# Patient Record
Sex: FEMALE | Race: WHITE | Employment: OTHER | URBAN - METROPOLITAN AREA
[De-identification: names, ages, dates, MRNs, and addresses within clinical notes are randomized per-mention and may not be internally consistent; named-entity substitution may affect disease eponyms.]

---

## 2017-09-22 ENCOUNTER — HOSPITAL ENCOUNTER (OUTPATIENT)
Dept: LAB | Age: 72
Discharge: HOME | End: 2017-09-22

## 2017-09-22 PROCEDURE — 88312 SPECIAL STAINS GROUP 1: CPT | Performed by: INTERNAL MEDICINE

## 2017-09-22 PROCEDURE — 88305 TISSUE EXAM BY PATHOLOGIST: CPT | Performed by: INTERNAL MEDICINE

## 2017-10-19 PROBLEM — R91.1 LUNG NODULE: Status: ACTIVE | Noted: 2017-10-19

## 2017-10-19 PROBLEM — Z01.810 PREOP CARDIOVASCULAR EXAM: Status: ACTIVE | Noted: 2017-10-19

## 2018-02-20 PROBLEM — C34.90 ADENOCARCINOMA OF LUNG (HCC): Status: ACTIVE | Noted: 2018-02-20

## 2018-02-20 PROBLEM — I71.40 ABDOMINAL AORTIC ANEURYSM (AAA) WITHOUT RUPTURE: Status: ACTIVE | Noted: 2018-02-20

## 2018-09-03 ENCOUNTER — ANESTHESIA EVENT (OUTPATIENT)
Dept: ENDOSCOPY | Age: 73
End: 2018-09-03
Payer: MEDICARE

## 2018-09-04 ENCOUNTER — HOSPITAL ENCOUNTER (OUTPATIENT)
Age: 73
Setting detail: OUTPATIENT SURGERY
Discharge: HOME | End: 2018-09-04
Attending: INTERNAL MEDICINE | Admitting: INTERNAL MEDICINE
Payer: MEDICARE

## 2018-09-04 ENCOUNTER — ANESTHESIA (OUTPATIENT)
Dept: ENDOSCOPY | Age: 73
End: 2018-09-04
Payer: MEDICARE

## 2018-09-04 VITALS
WEIGHT: 88 LBS | RESPIRATION RATE: 18 BRPM | TEMPERATURE: 98 F | HEART RATE: 54 BPM | BODY MASS INDEX: 18.99 KG/M2 | OXYGEN SATURATION: 92 % | HEIGHT: 57 IN | DIASTOLIC BLOOD PRESSURE: 67 MMHG | SYSTOLIC BLOOD PRESSURE: 151 MMHG

## 2018-09-04 PROCEDURE — 76060000031 HC ANESTHESIA FIRST 0.5 HR: Performed by: INTERNAL MEDICINE

## 2018-09-04 PROCEDURE — 74011250636 HC RX REV CODE- 250/636

## 2018-09-04 PROCEDURE — 76040000025: Performed by: INTERNAL MEDICINE

## 2018-09-04 PROCEDURE — 77030029929: Performed by: INTERNAL MEDICINE

## 2018-09-04 PROCEDURE — 74011250636 HC RX REV CODE- 250/636: Performed by: ANESTHESIOLOGY

## 2018-09-04 PROCEDURE — 74011250636 HC RX REV CODE- 250/636: Performed by: INTERNAL MEDICINE

## 2018-09-04 RX ORDER — SODIUM CHLORIDE 0.9 % (FLUSH) 0.9 %
5-10 SYRINGE (ML) INJECTION AS NEEDED
Status: DISCONTINUED | OUTPATIENT
Start: 2018-09-04 | End: 2018-09-04 | Stop reason: HOSPADM

## 2018-09-04 RX ORDER — SODIUM CHLORIDE, SODIUM LACTATE, POTASSIUM CHLORIDE, CALCIUM CHLORIDE 600; 310; 30; 20 MG/100ML; MG/100ML; MG/100ML; MG/100ML
100 INJECTION, SOLUTION INTRAVENOUS CONTINUOUS
Status: DISCONTINUED | OUTPATIENT
Start: 2018-09-04 | End: 2018-09-04 | Stop reason: HOSPADM

## 2018-09-04 RX ORDER — PROPOFOL 10 MG/ML
INJECTION, EMULSION INTRAVENOUS AS NEEDED
Status: DISCONTINUED | OUTPATIENT
Start: 2018-09-04 | End: 2018-09-04 | Stop reason: HOSPADM

## 2018-09-04 RX ORDER — LIDOCAINE HYDROCHLORIDE 20 MG/ML
INJECTION, SOLUTION EPIDURAL; INFILTRATION; INTRACAUDAL; PERINEURAL AS NEEDED
Status: DISCONTINUED | OUTPATIENT
Start: 2018-09-04 | End: 2018-09-04 | Stop reason: HOSPADM

## 2018-09-04 RX ORDER — PROPOFOL 10 MG/ML
INJECTION, EMULSION INTRAVENOUS
Status: DISCONTINUED | OUTPATIENT
Start: 2018-09-04 | End: 2018-09-04 | Stop reason: HOSPADM

## 2018-09-04 RX ADMIN — LIDOCAINE HYDROCHLORIDE 50 MG: 20 INJECTION, SOLUTION EPIDURAL; INFILTRATION; INTRACAUDAL; PERINEURAL at 13:19

## 2018-09-04 RX ADMIN — PROPOFOL 75 MCG/KG/MIN: 10 INJECTION, EMULSION INTRAVENOUS at 13:19

## 2018-09-04 RX ADMIN — PROPOFOL 50 MG: 10 INJECTION, EMULSION INTRAVENOUS at 13:19

## 2018-09-04 RX ADMIN — SODIUM CHLORIDE, SODIUM LACTATE, POTASSIUM CHLORIDE, AND CALCIUM CHLORIDE: 600; 310; 30; 20 INJECTION, SOLUTION INTRAVENOUS at 13:13

## 2018-09-04 RX ADMIN — ONABOTULINUMTOXINA 100 UNITS: 100 INJECTION, POWDER, LYOPHILIZED, FOR SOLUTION INTRADERMAL; INTRAMUSCULAR at 13:25

## 2018-09-04 RX ADMIN — SODIUM CHLORIDE, SODIUM LACTATE, POTASSIUM CHLORIDE, AND CALCIUM CHLORIDE 100 ML/HR: 600; 310; 30; 20 INJECTION, SOLUTION INTRAVENOUS at 11:48

## 2018-09-04 NOTE — IP AVS SNAPSHOT
Giana Parada 
 
 
 [address] [facility]  [address] [facility] 
[phone] Patient: Leyda Quispe MRN: [id number] OVN:[id number] About your hospitalization You were admitted on:  September 4, 2018 You last received care in the:  [facility] You were discharged on:  September 4, 2018 Why you were hospitalized Your primary diagnosis was:  Not on File Follow-up Information None Your Scheduled Appointments Friday September 14, 2018  9:00 AM EDT Office Visit with Giana Parada MD  
[facility] ([address] [place] [address]) [address] [place] [address] [address] [facility] 81  
[phone] Discharge Orders None A check felipe indicates which time of day the medication should be taken. My Medications ASK your doctor about these medications Instructions Each Dose to Equal  
 Morning Noon Evening Bedtime  
 albuterol 90 mcg/actuation inhaler Commonly known as:  PROVENTIL HFA, VENTOLIN HFA, PROAIR HFA Your last dose was: Your next dose is: Take 2 Puffs by inhalation every four (4) hours as needed. 2 Puff ALPRAZolam 1 mg tablet Commonly known as:  Rebbeca Elmer Your last dose was: Your next dose is: Take 1 mg by mouth nightly as needed. 1 mg  
    
   
   
   
  
 amoxicillin 500 mg Tab Your last dose was: Your next dose is: Take  by mouth. As directed for dental visits  
     
   
   
   
  
 apixaban 5 mg tablet Commonly known as:  Naif Koch Your last dose was: Your next dose is: Take 1 Tab by mouth two (2) times a day. 5 mg  
    
   
   
   
  
 ferrous sulfate 134 mg (27 mg iron) Tab Your last dose was: Your next dose is: Take 2 Tabs by mouth daily. 2 Tab FOSAMAX 70 mg tablet Generic drug:  alendronate Your last dose was: Your next dose is: Take 70 mg by mouth every seven (7) days. 70 mg  
    
   
   
   
  
 glucosamine-chondroitin 500-400 mg Cap Commonly known as:  [facility] Your last dose was: Your next dose is: Take 1 Cap by mouth daily. 1 Cap HYDROcodone-homatropine 5-1.5 mg/5 mL syrup Commonly known as:  HYCODAN Your last dose was: Your next dose is: Take 5 mL by mouth four (4) times daily as needed. 5 mL  
    
   
   
   
  
 lisinopril 10 mg tablet Commonly known as:  Gianni Economy Your last dose was: Your next dose is: Take 10 mg by mouth daily. 10 mg  
    
   
   
   
  
 LIVALO 2 mg tablet Generic drug:  pitavastatin calcium Your last dose was: Your next dose is: Take 2 mg by mouth daily. 2 mg Magnesium Oxide 500 mg Cap Your last dose was: Your next dose is: Take 500 mg by mouth every other day. 500 mg  
    
   
   
   
  
 multivitamin tablet Commonly known as:  ONE A DAY Your last dose was: Your next dose is: Take 1 Tab by mouth daily. 1 Tab  
    
   
   
   
  
 OCUVITE PO Your last dose was: Your next dose is: Take  by mouth. OLANZapine 5 mg tablet Commonly known as:  ZyPREXA Your last dose was: Your next dose is: Take 5 mg by mouth nightly. 5 mg  
    
   
   
   
  
 omega-3 fatty acids-vitamin e 1,000 mg Cap Your last dose was: Your next dose is: Take 1 Cap by mouth. 1 Cap  
    
   
   
   
  
 omeprazole 20 mg capsule Commonly known as:  PRILOSEC Your last dose was: Your next dose is: Take 20 mg by mouth two (2) times a day.   
 20 mg  
    
   
   
   
  
 OTHER Your last dose was: Your next dose is: Focus Factor- 1 po qd  
     
   
   
   
  
 raNITIdine 300 mg tablet Commonly known as:  ZANTAC Your last dose was: Your next dose is: Take 300 mg by mouth daily. 300 mg  
    
   
   
   
  
 tamsulosin 0.4 mg capsule Commonly known as:  FLOMAX Your last dose was: Your next dose is: Take 0.4 mg by mouth daily. 0.4 mg  
    
   
   
   
  
 VITAMIN B-12 1,000 mcg sublingual tablet Generic drug:  cyanocobalamin Your last dose was: Your next dose is: Take 1,000 mcg by mouth daily. 1000 mcg VITAMIN D3 2,000 unit Tab Generic drug:  cholecalciferol (vitamin D3) Your last dose was: Your next dose is: Take 2,000 Units by mouth daily. 2000 Units ZOFRAN PO Your last dose was: Your next dose is: Take  by mouth. Opioid Education Prescription Opioids: What You Need to Know: 
 
Prescription opioids can be used to help relieve moderate-to-severe pain and are often prescribed following a surgery or injury, or for certain health conditions. These medications can be an important part of treatment but also come with serious risks. Opioids are strong pain medicines. Examples include hydrocodone, oxycodone, fentanyl, and morphine. Heroin is an example of an illegal opioid. It is important to work with your health care provider to make sure you are getting the safest, most effective care. WHAT ARE THE RISKS AND SIDE EFFECTS OF OPIOID USE? Prescription opioids carry serious risks of addiction and overdose, especially with prolonged use. An opioid overdose, often marked by slow breathing, can cause sudden death. The use of prescription opioids can have a number of side effects as well, even when taken as directed. · Tolerance-meaning you might need to take more of a medication for the same pain relief · Physical dependence-meaning you have symptoms of withdrawal when the medication is stopped. Withdrawal symptoms can include nausea, sweating, chills, diarrhea, stomach cramps, and muscle aches. Withdrawal can last up to several weeks, depending on which drug you took and how long you took it. · Increased sensitivity to pain · Constipation · Nausea, vomiting, and dry mouth · Sleepiness and dizziness · Confusion · Depression · Low levels of testosterone that can result in lower sex drive, energy, and strength · Itching and sweating RISKS ARE GREATER WITH:      
· History of drug misuse, substance use disorder, or overdose · Mental health conditions (such as depression or anxiety) · Sleep apnea · Older age (72 years or older) · Pregnancy Avoid alcohol while taking prescription opioids. Also, unless specifically advised by your health care provider, medications to avoid include: · Benzodiazepines (such as Xanax or Valium) · Muscle relaxants (such as Soma or Flexeril) · Hypnotics (such as Ambien or Lunesta) · Other prescription opioids KNOW YOUR OPTIONS Talk to your health care provider about ways to manage your pain that don't involve prescription opioids. Some of these options may actually work better and have fewer risks and side effects. Options may include: 
· Pain relievers such as acetaminophen, ibuprofen, and naproxen · Some medications that are also used for depression or seizures · Physical therapy and exercise · Counseling to help patients learn how to cope better with triggers of pain and stress. · Application of heat or cold compress · Massage therapy · Relaxation techniques Be Informed Make sure you know the name of your medication, how much and how often to take it, and its potential risks & side effects.  
 
IF YOU ARE PRESCRIBED OPIOIDS FOR PAIN: 
 · Never take opioids in greater amounts or more often than prescribed. Remember the goal is not to be pain-free but to manage your pain at a tolerable level. · Follow up with your primary care provider to: · Work together to create a plan on how to manage your pain. · Talk about ways to help manage your pain that don't involve prescription opioids. · Talk about any and all concerns and side effects. · Help prevent misuse and abuse. · Never sell or share prescription opioids · Help prevent misuse and abuse. · Store prescription opioids in a secure place and out of reach of others (this may include visitors, children, friends, and family). · Safely dispose of unused/unwanted prescription opioids: Find your community drug take-back program or your pharmacy mail-back program, or flush them down the toilet, following guidance from the Food and Drug Administration (www.fda.gov/Drugs/ResourcesForYou). · Visit www.cdc.gov/drugoverdose to learn about the risks of opioid abuse and overdose. · If you believe you may be struggling with addiction, tell your health care provider and ask for guidance or call [facility] at 1-356-876-UIYE. Discharge Instructions Gastrointestinal Esophagogastroduodenoscopy (EGD) - Upper Exam Discharge Instructions 1. Call Dr. Binh Hayes for any problems or questions. 2. Contact the doctor's office for follow up appointment as directed. 3. Medication may cause drowsiness for several hours, therefore, do not drive or operate machinery for remainder of the day. 4. No alcohol today. 5. Ordinarily, you may resume regular diet and activity after exam unless otherwise specified by your physician. 6. For mild soreness in your throat you may use Cepacol throat lozenges or warm salt-water gargles as needed. Any additional instructions: Follow up with dr Binh Hayes in his office in 4-6 weeks Instructions given to Effie Escalante and other family members. ACO Transitions of Care Introducing [facility] offers a voluntary care coordination program to provide high quality service and care to [facility] fee-for-service beneficiaries. Dione Davis was designed to help you enhance your health and well-being through the following services: ? Transitions of Care  support for individuals who are transitioning from one care setting to another (example: Hospital to home). ? Chronic and Complex Care Coordination  support for individuals and caregivers of those with serious or chronic illnesses or with more than one chronic (ongoing) condition and those who take a number of different medications. If you meet specific medical criteria, a [address] [place] [address] Rd may call you directly to coordinate your care with your primary care physician and your other care providers. For questions about the [facility] programs, please, contact your physicians office. For general questions or additional information about Accountable Care Organizations: 
Please visit www.medicare.gov/acos. html or call 1-800-MEDICARE (0-106.680.2823) TTY users should call 2-138.329.1447. Introducing [facility]! Dear Katie Zavala: Thank you for requesting a [employer] account. Our records indicate that you already have an active [employer] account. You can access your account anytime at [email or web address] Did you know that you can access your hospital and ER discharge instructions at any time in Spark CRM? You can also review all of your test results from your hospital stay or ER visit. Additional Information If you have questions, please visit the Frequently Asked Questions section of the [employer] website at [email or web address] Remember, MyChart is NOT to be used for urgent needs. For medical emergencies, dial 911. Now available from your iPhone and Android! Introducing Mark Kapadia As a [employer] patient, I wanted to make you aware of our electronic visit tool called Mark Kapadia. [employer] 24/7 allows you to connect within minutes with a medical provider 24 hours a day, seven days a week via a mobile device or tablet or logging into a secure website from your computer. You can access Mark Kapadia from anywhere in the [place]. A virtual visit might be right for you when you have a simple condition and feel like you just dont want to get out of bed, or cant get away from work for an appointment, when your regular [employer] provider is not available (evenings, weekends or holidays), or when youre out of town and need minor care. Electronic visits cost only $49 and if the [employer] 24/7 provider determines a prescription is needed to treat your condition, one can be electronically transmitted to a nearby pharmacy*. Please take a moment to enroll today if you have not already done so. The enrollment process is free and takes just a few minutes. To enroll, please download the [employer] 24/7 shelby to your tablet or phone, or visit [email or web address] to enroll on your computer. And, as an [address] [place] [address] patient with a [employer] account, the results of your visits will be scanned into your electronic medical record and your primary care provider will be able to view the scanned results. We urge you to continue to see your regular [employer] provider for your ongoing medical care. And while your primary care provider may not be the one available when you seek a Mark Kapadia virtual visit, the peace of mind you get from getting a real diagnosis real time can be priceless.    
 
For more information on Mark Davidrafaelafin, view our Frequently Asked Questions (FAQs) at [email or web address]. Sincerely, 
 
Alec Winston MD 
Chief Medical Officer [address] *:  certain medications cannot be prescribed via Mark Kapadia Providers Seen During Your Hospitalization Provider Specialty Primary office phone Manjit Arreaga MD Gastroenterology [phone] Your Primary Care Physician (PCP) Primary Care Physician Office Phone Office Fax Rhys Cordova [phone] You are allergic to the following Allergen Reactions Aspirin Unknown (comments) Colestipol Other (comments) Abdominal pain Flagyl (Metronidazole) Unknown (comments) Linzess (Linaclotide) Diarrhea Sulfa (Sulfonamide Antibiotics) Unknown (comments) Tequin (Gatifloxacin) Unknown (comments) Tramadol Unknown (comments) Recent Documentation Height Weight BMI OB Status Smoking Status 1.448 m 39.9 kg 19.04 kg/m2 Hysterectomy Former Smoker Emergency Contacts Name Discharge Info Relation Home Work Mobile Rasta Berumen  Spouse [3] [phone] Marielos Bucio  Sister [23] [phone] Patient Belongings The following personal items are in your possession at time of discharge: 
  Dental Appliances: None  Visual Aid: Glasses Please provide this summary of care documentation to your next provider. Signatures-by signing, you are acknowledging that this After Visit Summary has been reviewed with you and you have received a copy. Patient Signature:  ____________________________________________________________ Date:  ____________________________________________________________  
  
[employer] Provider Signature:  ____________________________________________________________ Date:  ____________________________________________________________

## 2018-09-04 NOTE — DISCHARGE INSTRUCTIONS
Gastrointestinal Esophagogastroduodenoscopy (EGD) - Upper Exam Discharge Instructions    1. Call Dr. Babs Suarez for any problems or questions. 2. Contact the doctor's office for follow up appointment as directed. 3. Medication may cause drowsiness for several hours, therefore, do not drive or operate machinery for remainder of the day. 4. No alcohol today. 5. Ordinarily, you may resume regular diet and activity after exam unless otherwise specified by your physician. 6. For mild soreness in your throat you may use Cepacol throat lozenges or warm salt-water gargles as needed. Any additional instructions: Follow up with dr Babs Suarez in his office in 4-6 weeks     Instructions given to Leandra Purcell and other family members.

## 2018-09-04 NOTE — PROGRESS NOTES
Pt stopped eliquis on Sunday and started 81mg ASA in replacement. Does not take ASA as a regular medication.

## 2018-09-04 NOTE — H&P
Gastroenterology Outpatient History and Physical 
 
Patient: Shashi Degroot Physician: Steven Slater MD 
 
Vital Signs: Blood pressure 137/61, pulse (!) 49, temperature 98 °F (36.7 °C), resp. rate 16, height 4' 9\" (1.448 m), weight 39.9 kg (88 lb), SpO2 95 %. Allergies: Allergies Allergen Reactions  Aspirin Unknown (comments)  Colestipol Other (comments) Abdominal pain  Flagyl [Metronidazole] Unknown (comments)  Linzess [Linaclotide] Diarrhea  Sulfa (Sulfonamide Antibiotics) Unknown (comments)  Tequin [Gatifloxacin] Unknown (comments)  Tramadol Unknown (comments) Chief Complaint: dysphagia History of Present Illness: As Above Justification for Procedure: As Above History: 
Past Medical History:  
Diagnosis Date  Abnormal loss of weight 10/21/2015  Atrial fibrillation ([facility]  CAD (coronary atherosclerotic disease)  Cancer ([facility] lung  Chest pain, unspec 10/21/2015  Cough  Depression 10/21/2015  Dizziness/Vertigo 10/21/2015  Fever  GERD (gastroesophageal reflux disease)  Hypertension, benign  S/P aortic valve replacement with bioprosthetic valve  Seizure disorder [facility])   
 patient denies  Thoracic aortic aneurysm ([facility] Past Surgical History:  
Procedure Laterality Date  ABDOMEN SURGERY PROC UNLISTED  2000  
 stomach wrap  CARDIAC SURG PROCEDURE UNLIST  2008 Assending Aorta replaced  CARDIAC SURG PROCEDURE UNLIST  2009 Aortic arch replaced  CARDIAC SURG PROCEDURE UNLIST  2010  
 decending aorta replaced  HX CATARACT REMOVAL Bilateral   
 IOL  HX CHOLECYSTECTOMY  HX HERNIA REPAIR  2010  HX ORTHOPAEDIC  2005  
 3 vertabre fused  HX ORTHOPAEDIC Right 2006  
 right shoulder rotater cuff  HX OTHER SURGICAL Right 01/2018 Nodule removed from lung  HX TONSILLECTOMY  HX TRANSPLANT    
 aortic arch replaced with artificial, ascending and descending artificial parts  SC EGD DELIVER THERMAL ENERGY SPHNCTR/CARDIA GERD  2017  VASCULAR SURGERY PROCEDURE UNLIST Social History Social History  Marital status:  Spouse name: N/A  
 Number of children: N/A  
 Years of education: N/A Social History Main Topics  Smoking status: Former Smoker Quit date: 4/13/1993  Smokeless tobacco: Never Used  Alcohol use No  
 Drug use: No  
 Sexual activity: Not Asked Other Topics Concern  None Social History Narrative Family History Problem Relation Age of Onset  Alcohol abuse Father Medications:  
Prior to Admission medications Medication Sig Start Date End Date Taking? Authorizing Provider OLANZapine (ZYPREXA) 5 mg tablet Take 5 mg by mouth nightly. Yes Historical Provider  
lisinopril (PRINIVIL, ZESTRIL) 10 mg tablet Take 10 mg by mouth daily. Yes Historical Provider  
tamsulosin (FLOMAX) 0.4 mg capsule Take 0.4 mg by mouth daily. Yes Historical Provider  
alendronate (FOSAMAX) 70 mg tablet Take 70 mg by mouth every seven (7) days. Yes Historical Provider  
ferrous sulfate 134 mg (27 mg iron) tab Take 2 Tabs by mouth daily. Yes Historical Provider  
cholecalciferol, vitamin D3, (VITAMIN D3) 2,000 unit tab Take 2,000 Units by mouth daily. Yes Historical Provider HYDROcodone-homatropine (HYCODAN) 5-1.5 mg/5 mL syrup Take 5 mL by mouth four (4) times daily as needed. Yes Historical Provider  
pitavastatin calcium (LIVALO) 2 mg tablet Take 2 mg by mouth daily. Yes Historical Provider  
glucosamine-chondroitin (ARTHX) 500-400 mg cap Take 1 Cap by mouth daily. Yes Historical Provider  
cyanocobalamin (VITAMIN B-12) 1,000 mcg sublingual tablet Take 1,000 mcg by mouth daily. Yes Historical Provider  
raNITIdine (ZANTAC) 300 mg tablet Take 300 mg by mouth daily. Yes Historical Provider  
omeprazole (PRILOSEC) 20 mg capsule Take 20 mg by mouth two (2) times a day. Yes Historical Provider ALPRAZolam (XANAX) 1 mg tablet Take 1 mg by mouth nightly as needed. Yes Historical Provider  
multivitamin (ONE A DAY) tablet Take 1 Tab by mouth daily. Yes Historical Provider VIT C/VIT E/LUTEIN/MIN/OMEGA-3 (OCUVITE PO) Take  by mouth. Yes Historical Provider Magnesium Oxide 500 mg cap Take 500 mg by mouth every other day. Yes Historical Provider  
ondansetron HCl (ZOFRAN PO) Take  by mouth. Historical Provider  
amoxicillin 500 mg tab Take  by mouth. As directed for dental visits    Historical Provider  
apixaban (ELIQUIS) 5 mg tablet Take 1 Tab by mouth two (2) times a day. Patient taking differently: Take 5 mg by mouth daily. 4/19/18   Ritchie Michel MD  
OTHER Focus Factor- 1 po qd    Historical Provider  
albuterol (PROVENTIL HFA, VENTOLIN HFA, PROAIR HFA) 90 mcg/actuation inhaler Take 2 Puffs by inhalation every four (4) hours as needed. Historical Provider  
omega-3 fatty acids-vitamin e 1,000 mg cap Take 1 Cap by mouth. Historical Provider Physical Exam:  
  
  
Heart: regular rate and rhythm, S1, S2 normal, no murmur, click, rub or gallop Lungs: chest clear, no wheezing, rales, normal symmetric air entry, Heart exam - S1, S2 normal, no murmur, no gallop, rate regular Abdominal: Bowel sounds are normal, Bowel sounds active, liver is not enlarged, spleen is not enlarged Findings/Diagnosis: dysphagia Possible primary vs. Secondary achalasia from Nissen wrap. EGD with botox Signed: 
Lorraine Merritt MD 9/4/2018

## 2018-09-04 NOTE — ANESTHESIA PREPROCEDURE EVALUATION
Anesthetic History No history of anesthetic complications Review of Systems / Medical History Patient summary reviewed and pertinent labs reviewed Pulmonary Within defined limits Neuro/Psych Cardiovascular Hypertension Dysrhythmias (h/o afib) CAD Comments: S/p ascending, arch and descending aortic replacement. Denies angina or chest pain. 3METS. GI/Hepatic/Renal 
  
GERD Endo/Other Other Findings Physical Exam 
 
Airway Mallampati: II 
TM Distance: 4 - 6 cm Neck ROM: normal range of motion Cardiovascular Rhythm: regular Dental 
 
Dentition: Upper partial plate Pulmonary Breath sounds clear to auscultation Abdominal 
 
 
 
 Other Findings Anesthetic Plan ASA: 3 Anesthesia type: total IV anesthesia Induction: Intravenous Anesthetic plan and risks discussed with: Patient

## 2018-09-04 NOTE — PROCEDURES
Esophagogastroduodenoscopy    DATE of PROCEDURE: 9/4/2018    INDICATION:  1. History of Nissen fundoplication  2. Dysphagia (Achalasia on manometry)    POSTPROCEDURE DIAGNOSIS:  1. Nissen fundoplication  2. Successful botox injection    MEDICATIONS ADMINISTERED: MAC. Further details per anesthesia note. INSTRUMENT: [id number]    PROCEDURE:  After obtaining informed consent, the patient was placed in the left lateral position and sedated. The endoscope was advanced under direct vision without difficulty. The esophagus, stomach (including retroflexed views) and duodenum were evaluated. The patient was taken to the recovery area in stable condition. FINDINGS:  ESOPHAGUS:Resistance at EG junction with passage of gastroscope. 100 units botox injected into 4 quadrants at EG junction. Otherwise normal exam.  STOMACH: Intact Nissen fundoplication. Otherwise normal exam.  DUODENUM: Normal    Estimated blood loss: 0-minimal   Specimens obtained during procedure: none    PLAN:  1.  Follow-up in office in 4-6 weeks      Sherri Guido MD  [facility] [place]

## 2018-09-05 NOTE — ANESTHESIA POSTPROCEDURE EVALUATION
Post-Anesthesia Evaluation and Assessment Patient: Syd Nicole MRN: [id number]  SSN: [id number] YOB: 1945  Age: 74 y.o. Sex: female Cardiovascular Function/Vital Signs Visit Vitals  /67  Pulse (!) 54  Temp 36.7 °C (98 °F)  Resp 18  Ht 4' 9\" (1.448 m)  Wt 39.9 kg (88 lb)  SpO2 92%  BMI 19.04 kg/m2 Patient is status post total IV anesthesia anesthesia for Procedure(s): ESOPHAGOGASTRODUODENOSCOPY (EGD)  BMI 19 INJECTION. Nausea/Vomiting: None Postoperative hydration reviewed and adequate. Pain: 
Pain Scale 1: Numeric (0 - 10) (09/04/18 1407) Pain Intensity 1: 0 (09/04/18 1407) Managed Neurological Status: At baseline Mental Status and Level of Consciousness: Arousable Pulmonary Status:  
O2 Device: Room air (09/04/18 1407) Adequate oxygenation and airway patent Complications related to anesthesia: None Post-anesthesia assessment completed. No concerns Signed By: Amarilis Cortes MD   
 September 5, 2018

## 2018-11-13 PROBLEM — K22.0 ACHALASIA: Status: ACTIVE | Noted: 2018-11-13

## 2019-01-21 RX ORDER — METRONIDAZOLE 500 MG/100ML
500 INJECTION, SOLUTION INTRAVENOUS ONCE
Status: CANCELLED | OUTPATIENT
Start: 2019-01-21 | End: 2019-01-21

## 2019-01-24 ENCOUNTER — HOME HEALTH ADMISSION (OUTPATIENT)
Dept: HOME HEALTH SERVICES | Facility: HOME HEALTH | Age: 74
End: 2019-01-24

## 2019-01-27 ENCOUNTER — ANESTHESIA EVENT (OUTPATIENT)
Dept: ENDOSCOPY | Age: 74
End: 2019-01-27
Payer: MEDICARE

## 2019-01-27 RX ORDER — SODIUM CHLORIDE, SODIUM LACTATE, POTASSIUM CHLORIDE, CALCIUM CHLORIDE 600; 310; 30; 20 MG/100ML; MG/100ML; MG/100ML; MG/100ML
100 INJECTION, SOLUTION INTRAVENOUS CONTINUOUS
Status: CANCELLED | OUTPATIENT
Start: 2019-01-27

## 2019-01-28 ENCOUNTER — ANESTHESIA (OUTPATIENT)
Dept: ENDOSCOPY | Age: 74
End: 2019-01-28
Payer: MEDICARE

## 2019-01-28 ENCOUNTER — HOSPITAL ENCOUNTER (OUTPATIENT)
Age: 74
Setting detail: OUTPATIENT SURGERY
Discharge: HOME | End: 2019-01-28
Attending: INTERNAL MEDICINE | Admitting: INTERNAL MEDICINE
Payer: MEDICARE

## 2019-01-28 VITALS
BODY MASS INDEX: 17.47 KG/M2 | TEMPERATURE: 97 F | SYSTOLIC BLOOD PRESSURE: 172 MMHG | RESPIRATION RATE: 14 BRPM | HEART RATE: 64 BPM | WEIGHT: 81 LBS | OXYGEN SATURATION: 94 % | DIASTOLIC BLOOD PRESSURE: 81 MMHG | HEIGHT: 57 IN

## 2019-01-28 PROCEDURE — 74011250636 HC RX REV CODE- 250/636: Performed by: ANESTHESIOLOGY

## 2019-01-28 PROCEDURE — 74011250636 HC RX REV CODE- 250/636: Performed by: INTERNAL MEDICINE

## 2019-01-28 PROCEDURE — 76040000025: Performed by: INTERNAL MEDICINE

## 2019-01-28 PROCEDURE — 74011250637 HC RX REV CODE- 250/637: Performed by: INTERNAL MEDICINE

## 2019-01-28 PROCEDURE — 77030005122 HC CATH GASTMY PEG BSC -B: Performed by: INTERNAL MEDICINE

## 2019-01-28 PROCEDURE — 77030036554: Performed by: INTERNAL MEDICINE

## 2019-01-28 PROCEDURE — 74011250636 HC RX REV CODE- 250/636

## 2019-01-28 PROCEDURE — 74011000258 HC RX REV CODE- 258: Performed by: INTERNAL MEDICINE

## 2019-01-28 PROCEDURE — 76060000032 HC ANESTHESIA 0.5 TO 1 HR: Performed by: INTERNAL MEDICINE

## 2019-01-28 RX ORDER — METRONIDAZOLE 500 MG/100ML
500 INJECTION, SOLUTION INTRAVENOUS
Status: DISCONTINUED | OUTPATIENT
Start: 2019-01-28 | End: 2019-01-28

## 2019-01-28 RX ORDER — CLINDAMYCIN PHOSPHATE 900 MG/50ML
900 INJECTION INTRAVENOUS EVERY 8 HOURS
Status: DISCONTINUED | OUTPATIENT
Start: 2019-01-28 | End: 2019-01-28 | Stop reason: HOSPADM

## 2019-01-28 RX ORDER — CLINDAMYCIN PHOSPHATE 300 MG/50ML
300 INJECTION INTRAVENOUS ONCE
Status: DISCONTINUED | OUTPATIENT
Start: 2019-01-28 | End: 2019-01-28 | Stop reason: DRUGHIGH

## 2019-01-28 RX ORDER — ACETAMINOPHEN 325 MG/1
650 TABLET ORAL
Status: COMPLETED | OUTPATIENT
Start: 2019-01-28 | End: 2019-01-28

## 2019-01-28 RX ORDER — PROPOFOL 10 MG/ML
INJECTION, EMULSION INTRAVENOUS AS NEEDED
Status: DISCONTINUED | OUTPATIENT
Start: 2019-01-28 | End: 2019-01-28 | Stop reason: HOSPADM

## 2019-01-28 RX ORDER — CEFAZOLIN SODIUM/WATER 2 G/20 ML
2 SYRINGE (ML) INTRAVENOUS
Status: DISCONTINUED | OUTPATIENT
Start: 2019-01-28 | End: 2019-01-28

## 2019-01-28 RX ORDER — LIDOCAINE HYDROCHLORIDE 20 MG/ML
INJECTION, SOLUTION EPIDURAL; INFILTRATION; INTRACAUDAL; PERINEURAL AS NEEDED
Status: DISCONTINUED | OUTPATIENT
Start: 2019-01-28 | End: 2019-01-28 | Stop reason: HOSPADM

## 2019-01-28 RX ORDER — METRONIDAZOLE 500 MG/100ML
500 INJECTION, SOLUTION INTRAVENOUS ONCE
Status: DISCONTINUED | OUTPATIENT
Start: 2019-01-28 | End: 2019-01-28

## 2019-01-28 RX ORDER — SODIUM CHLORIDE, SODIUM LACTATE, POTASSIUM CHLORIDE, CALCIUM CHLORIDE 600; 310; 30; 20 MG/100ML; MG/100ML; MG/100ML; MG/100ML
100 INJECTION, SOLUTION INTRAVENOUS CONTINUOUS
Status: DISCONTINUED | OUTPATIENT
Start: 2019-01-28 | End: 2019-01-28 | Stop reason: HOSPADM

## 2019-01-28 RX ORDER — PROPOFOL 10 MG/ML
INJECTION, EMULSION INTRAVENOUS
Status: DISCONTINUED | OUTPATIENT
Start: 2019-01-28 | End: 2019-01-28 | Stop reason: HOSPADM

## 2019-01-28 RX ADMIN — ACETAMINOPHEN 650 MG: 325 TABLET, FILM COATED ORAL at 13:48

## 2019-01-28 RX ADMIN — PROPOFOL 140 MCG/KG/MIN: 10 INJECTION, EMULSION INTRAVENOUS at 12:46

## 2019-01-28 RX ADMIN — LIDOCAINE HYDROCHLORIDE 30 MG: 20 INJECTION, SOLUTION EPIDURAL; INFILTRATION; INTRACAUDAL; PERINEURAL at 12:43

## 2019-01-28 RX ADMIN — SODIUM CHLORIDE 1000 MG: 900 INJECTION, SOLUTION INTRAVENOUS at 12:34

## 2019-01-28 RX ADMIN — PROPOFOL 20 MG: 10 INJECTION, EMULSION INTRAVENOUS at 12:42

## 2019-01-28 RX ADMIN — PROPOFOL 30 MG: 10 INJECTION, EMULSION INTRAVENOUS at 12:44

## 2019-01-28 RX ADMIN — SODIUM CHLORIDE, SODIUM LACTATE, POTASSIUM CHLORIDE, AND CALCIUM CHLORIDE: 600; 310; 30; 20 INJECTION, SOLUTION INTRAVENOUS at 12:37

## 2019-01-28 RX ADMIN — SODIUM CHLORIDE, SODIUM LACTATE, POTASSIUM CHLORIDE, AND CALCIUM CHLORIDE 100 ML/HR: 600; 310; 30; 20 INJECTION, SOLUTION INTRAVENOUS at 12:11

## 2019-01-28 RX ADMIN — CLINDAMYCIN PHOSPHATE 900 MG: 900 INJECTION, SOLUTION INTRAVENOUS at 12:40

## 2019-01-28 RX ADMIN — PROPOFOL 30 MG: 10 INJECTION, EMULSION INTRAVENOUS at 12:43

## 2019-01-28 NOTE — PROCEDURES
PERCUTANEOUS ENDOSCOPIC GASTROSTOMY (PEG)    DATE of PROCEDURE: 1/28/2019    INDICATION:  1. Dysphagia  2. Malnutrition    POSTPROCEDURE DIAGNOSIS:  1. PEG placement    MEDICATIONS ADMINISTERED: MAC. Further details per anesthesia note. INSTRUMENT: [id number]    PROCEDURE:  After obtaining informed consent, the patient was left in the supine position and sedated. The endoscope was advanced under direct vision without difficulty. The esophagus, stomach (including retroflexed views) and duodenum were evaluated. PEG placement is as per surgeon's note. Re-endoscopy after the PEG confirmed appropriate position/location of inner bolster in the anterior wall, body of the stomach. The patient was taken to the recovery area in stable condition. FINDINGS:  ESOPHAGUS: Normal  STOMACH: 20 Fr PEG placed with external bolster at 2.5 cm. Otherwise normal exam.  DUODENUM: Normal.    Estimated blood loss: 0-minimal   Specimens obtained during procedure: none    PLAN:  - May use PEG tube immediately for medications  - May use PEG tube in 4 hours for tube feeds per nutrition recommendations  - Keep head of bed elevated 30 degrees while PEG tube in use  - Check residuals qshift. Hold tube feeds if residual >60ml. Recheck residual in 2 hours and call house officer if still >60ml.    - Rotate external bolster daily.   - Clean PEG tube site with soap and water daily  - Flush PEG tube with 60 mL water Q8H and after each use    Sania Pineda MD  [facility], [place]

## 2019-01-28 NOTE — H&P
Gastroenterology Outpatient History and Physical 
 
Patient: Berto Cuff Physician: Marisol Ontiveros MD 
 
Vital Signs: Blood pressure 183/79, pulse 74, temperature 98.6 °F (37 °C), resp. rate 18, height 4' 9\" (1.448 m), weight 36.7 kg (81 lb), SpO2 99 %. Allergies: Allergies Allergen Reactions  Sulfa (Sulfonamide Antibiotics) Anaphylaxis  Aspirin Unknown (comments) Upset stomach-- \" eats my stomach\"  Colestipol Other (comments) Abdominal pain  Flagyl [Metronidazole] Nausea and Vomiting  Linzess [Linaclotide] Diarrhea  Tequin [Gatifloxacin] Nausea and Vomiting  Tramadol Nausea and Vomiting Chief Complaint: dysphagia, malnutrition History of Present Illness: As Above Justification for Procedure: As Above History: 
Past Medical History:  
Diagnosis Date  AAA (abdominal aortic aneurysm) ([facility] [address] 6.6 cm-- per spouse-- pt needing to gain wt so she can have surg-- reason PEG being placed  Abnormal loss of weight 10/21/2015  Atrial fibrillation ([facility] [address] followed by dr Kyle Dennison  CAD (coronary atherosclerotic disease)   
 no mi/ no stents-- valve replaced 2008--followed by dr Kyle Dennison  Cancer [facility]) onset 1/2018  
 right lung --- surg only--- followed by dr Geraldine Pham  Chest pain, unspec 10/21/2015  
 occ--- followed by dr. Kyle Dennison  Cough  Depression 10/21/2015  Dizziness/Vertigo 10/21/2015  
 occ  GERD (gastroesophageal reflux disease)   
  not very well controlled  Hypertension, benign   
 controlled with med  Marijuana use   
 smokes most days per pt-- for pain  S/P aortic valve replacement with bioprosthetic valve  Thoracic aortic aneurysm ([facility] [address] replaced---2008, 2009, 2010 Past Surgical History:  
Procedure Laterality Date  ABDOMEN SURGERY PROC UNLISTED  2000  
 stomach wrap  CARDIAC SURG PROCEDURE UNLIST  2008 Assending Aorta replaced  CARDIAC SURG PROCEDURE UNLIST  2009 Aortic arch replaced  CARDIAC SURG PROCEDURE UNLIST  2010  
 decending aorta replaced  HX CATARACT REMOVAL Bilateral   
 IOL  HX CHOLECYSTECTOMY  HX HERNIA REPAIR  2010  HX ORTHOPAEDIC  2005  
 3 vertabre fused--- cervical  
 HX ORTHOPAEDIC Right 2006  
 right shoulder rotater cuff  HX OTHER SURGICAL Right 2018 Nodule removed from lung  HX OTHER SURGICAL  2018  
 egd  HX TONSILLECTOMY  HX TRANSPLANT    
 aortic arch replaced with artificial, ascending and descending artificial parts  SD EGD DELIVER THERMAL ENERGY SPHNCTR/CARDIA GERD  2017  VASCULAR SURGERY PROCEDURE UNLIST Social History Socioeconomic History  Marital status:  Spouse name: Not on file  Number of children: Not on file  Years of education: Not on file  Highest education level: Not on file Tobacco Use  Smoking status: Former Smoker Packs/day: 1.00 Years: 20.00 Pack years: 20.00 Last attempt to quit: 1993 Years since quittin.8  Smokeless tobacco: Never Used Substance and Sexual Activity  Alcohol use: No  
 Drug use: Yes Types: Marijuana Comment: pt states uses for pain-- smokes daily Family History Problem Relation Age of Onset  Alcohol abuse Father  Cancer Sister Medications:  
Prior to Admission medications Medication Sig Start Date End Date Taking? Authorizing Provider  
pitavastatin calcium (LIVALO) 1 mg tab tablet Take 1 mg by mouth daily. Provider, Historical  
metoprolol tartrate (LOPRESSOR) 25 mg tablet Take 25 mg by mouth daily. Provider, Historical  
omeprazole magnesium (PRILOSEC PO) Take 20 mg by mouth daily. Provider, Historical  
acetaminophen (TYLENOL) 325 mg tablet Take 500 mg by mouth nightly. Provider, Historical  
dicyclomine (BENTYL) 10 mg capsule Take 10 mg by mouth three (3) times daily as needed.     Provider, Historical  
 QUEtiapine (SEROQUEL) 100 mg tablet Take 150 mg by mouth every evening. Provider, Historical  
ondansetron HCl (ZOFRAN PO) Take  by mouth daily as needed. Provider, Historical  
tamsulosin (FLOMAX) 0.4 mg capsule Take 0.4 mg by mouth daily. Provider, Historical  
alendronate (FOSAMAX) 70 mg tablet Take 70 mg by mouth every seven (7) days. Takes on sat    Provider, Historical  
amoxicillin 500 mg tab Take  by mouth. As directed for dental visits    Provider, Historical  
cholecalciferol, vitamin D3, (VITAMIN D3) 2,000 unit tab Take 2,000 Units by mouth every Monday, Wednesday, Friday. Provider, Historical  
HYDROcodone-homatropine (HYCODAN) 5-1.5 mg/5 mL syrup Take 5 mL by mouth four (4) times daily as needed. Provider, Historical  
glucosamine-chondroitin (ARTHX) 500-400 mg cap Take 1 Cap by mouth daily. Provider, Historical  
cyanocobalamin (VITAMIN B-12) 1,000 mcg sublingual tablet Take 1,000 mcg by mouth daily. Provider, Historical  
raNITIdine (ZANTAC) 300 mg tablet Take 300 mg by mouth daily. Provider, Historical  
albuterol (PROVENTIL HFA, VENTOLIN HFA, PROAIR HFA) 90 mcg/actuation inhaler Take 2 Puffs by inhalation every four (4) hours as needed. Provider, Historical  
ALPRAZolam (XANAX) 1 mg tablet Take 1 mg by mouth nightly. Provider, Historical  
omega-3 fatty acids-vitamin e 1,000 mg cap Take 1 Cap by mouth. Provider, Historical  
multivitamin (ONE A DAY) tablet Take 1 Tab by mouth daily. Provider, Historical  
VIT C/VIT E/LUTEIN/MIN/OMEGA-3 (OCUVITE PO) Take  by mouth. Provider, Historical  
Magnesium Oxide 500 mg cap Take 500 mg by mouth daily as needed. Provider, Historical  
 
 
Physical Exam:  
  
  
Heart: regular rate and rhythm, S1, S2 normal, no murmur, click, rub or gallop Lungs: chest clear, no wheezing, rales, normal symmetric air entry, Heart exam - S1, S2 normal, no murmur, no gallop, rate regular Abdominal: Bowel sounds are normal, Bowel sounds active, liver is not enlarged, spleen is not enlarged Findings/Diagnosis: Dysphagia, malnutrition PEG Signed: 
Cathy Ayers MD 1/28/2019

## 2019-01-28 NOTE — ANESTHESIA POSTPROCEDURE EVALUATION
Procedure(s): PERCUTANEOUS ENDOSCOPIC GASTROSTOMY TUBE INSERTION 
ESOPHAGOGASTRODUODENOSCOPY (EGD). Anesthesia Post Evaluation Patient location during evaluation: PACU Patient participation: complete - patient participated Level of consciousness: awake Pain management: satisfactory to patient Airway patency: patent Anesthetic complications: no 
Cardiovascular status: hemodynamically stable Respiratory status: spontaneous ventilation Hydration status: euvolemic Post anesthesia nausea and vomiting:  none Visit Vitals /70 Pulse 68 Temp 36.1 °C (97 °F) Resp 16 Ht 4' 9\" (1.448 m) Wt 36.7 kg (81 lb) SpO2 98% BMI 17.53 kg/m²

## 2019-01-28 NOTE — PROGRESS NOTES
Pt c/o 8/10 abdominal pain at PEG insertion site. Dr. Adam Coburn informed, orders received to give 650mg Tylenol PO once.

## 2019-01-28 NOTE — ROUTINE PROCESS
VSS. Discharge instructions reviewed with patient and  and copy of instructions sent home with patient. Dr. Ila Pickett spoke with patient and  prior to discharge. Questions answered. Discharged via car, wheeled out by [facility]. IV discontinued prior to discharge. Personal items with patient at discharge: clothing

## 2019-01-28 NOTE — ANESTHESIA PREPROCEDURE EVALUATION
Anesthetic History No history of anesthetic complications Review of Systems / Medical History Patient summary reviewed and pertinent labs reviewed Pulmonary Smoker (marijuana) Neuro/Psych Psychiatric history Cardiovascular Hypertension Valvular problems/murmurs (AVR 2008) Dysrhythmias (h/o afib) : atrial fibrillation CAD and PAD (6.6 cm AAA, s/p thoracic EVAR ) Exercise tolerance: <4 METS: Ambulates well unassisted Comments: S/p ascending, arch and descending aortic replacement. Denies angina or chest pain. 3METS. Preserved EF.  
GI/Hepatic/Renal 
  
GERD Comments: Achalasia Endo/Other Cancer (right lung) Other Findings Physical Exam 
 
Airway Mallampati: II 
TM Distance: 4 - 6 cm Neck ROM: normal range of motion Cardiovascular Rhythm: regular Rate: normal 
 
 
 
 Dental 
 
Dentition: Upper partial plate Pulmonary Breath sounds clear to auscultation Abdominal 
 
 
 
 Other Findings Anesthetic Plan ASA: 4 Anesthesia type: total IV anesthesia Induction: Intravenous Anesthetic plan and risks discussed with: Patient and Spouse Needs PEG to gain strength for upcoming AAA surgery. Pt denies sxs from AAA. I explained my concerns regarding her AAA.

## 2019-01-28 NOTE — DISCHARGE INSTRUCTIONS
Gastrointestinal Esophagogastroduodenoscopy (EGD)/ Endoscopic Ultrasound(EUS)- Upper Exam Discharge Instructions    1. Call Dr. Adam Coburn  for any problems or questions. 2. Contact the doctor's office for follow up appointment as directed. 3. Medication may cause drowsiness for several hours, therefore, do not drive or operate machinery for remainder of the day. 4. No alcohol today. 5. Ordinarily, you may resume regular diet and activity after exam unless otherwise specified by your physician. 6. For mild soreness in your throat you may use Cepacol throat lozenges or warm  salt-water gargles as needed. Any additional instructions:   - May use PEG tube immediately for medications  - May use PEG tube in 4 hours for tube feeds per nutrition recommendations  - Keep head of bed elevated 30 degrees while PEG tube in use  - Check residuals qshift. Hold tube feeds if residual >60ml. Recheck residual in 2 hours and call house officer if still >60ml.    - Rotate external bolster daily. - Clean PEG tube site with soap and water daily  - Flush PEG tube with 60 mL water Q8H and after each use         Instructions given to Stefan Jordan and other family members.

## 2019-09-24 PROBLEM — Z01.810 PREOP CARDIOVASCULAR EXAM: Status: RESOLVED | Noted: 2017-10-19 | Resolved: 2019-09-24

## 2019-11-03 PROBLEM — Z01.810 PREOP CARDIOVASCULAR EXAM: Status: RESOLVED | Noted: 2017-10-19 | Resolved: 2019-11-03

## 2019-12-30 ENCOUNTER — ANESTHESIA EVENT (OUTPATIENT)
Dept: ENDOSCOPY | Age: 74
End: 2019-12-30
Payer: MEDICARE

## 2019-12-31 ENCOUNTER — HOSPITAL ENCOUNTER (OUTPATIENT)
Age: 74
Setting detail: OUTPATIENT SURGERY
Discharge: HOME | End: 2019-12-31
Attending: INTERNAL MEDICINE | Admitting: INTERNAL MEDICINE
Payer: MEDICARE

## 2019-12-31 ENCOUNTER — ANESTHESIA (OUTPATIENT)
Dept: ENDOSCOPY | Age: 74
End: 2019-12-31
Payer: MEDICARE

## 2019-12-31 VITALS
SYSTOLIC BLOOD PRESSURE: 175 MMHG | HEIGHT: 57 IN | HEART RATE: 66 BPM | OXYGEN SATURATION: 95 % | TEMPERATURE: 98.6 F | BODY MASS INDEX: 23.51 KG/M2 | RESPIRATION RATE: 14 BRPM | DIASTOLIC BLOOD PRESSURE: 81 MMHG | WEIGHT: 109 LBS

## 2019-12-31 PROCEDURE — 74011250636 HC RX REV CODE- 250/636: Performed by: NURSE ANESTHETIST, CERTIFIED REGISTERED

## 2019-12-31 PROCEDURE — 88312 SPECIAL STAINS GROUP 1: CPT

## 2019-12-31 PROCEDURE — 76060000031 HC ANESTHESIA FIRST 0.5 HR: Performed by: INTERNAL MEDICINE

## 2019-12-31 PROCEDURE — 74011250636 HC RX REV CODE- 250/636: Performed by: ANESTHESIOLOGY

## 2019-12-31 PROCEDURE — 76040000025: Performed by: INTERNAL MEDICINE

## 2019-12-31 PROCEDURE — 77030021593 HC FCPS BIOP ENDOSC BSC -A: Performed by: INTERNAL MEDICINE

## 2019-12-31 PROCEDURE — 88305 TISSUE EXAM BY PATHOLOGIST: CPT

## 2019-12-31 RX ORDER — PROPOFOL 10 MG/ML
INJECTION, EMULSION INTRAVENOUS AS NEEDED
Status: DISCONTINUED | OUTPATIENT
Start: 2019-12-31 | End: 2019-12-31 | Stop reason: HOSPADM

## 2019-12-31 RX ORDER — SODIUM CHLORIDE, SODIUM LACTATE, POTASSIUM CHLORIDE, CALCIUM CHLORIDE 600; 310; 30; 20 MG/100ML; MG/100ML; MG/100ML; MG/100ML
100 INJECTION, SOLUTION INTRAVENOUS CONTINUOUS
Status: DISCONTINUED | OUTPATIENT
Start: 2019-12-31 | End: 2019-12-31 | Stop reason: HOSPADM

## 2019-12-31 RX ADMIN — SODIUM CHLORIDE, SODIUM LACTATE, POTASSIUM CHLORIDE, AND CALCIUM CHLORIDE 100 ML/HR: 600; 310; 30; 20 INJECTION, SOLUTION INTRAVENOUS at 11:43

## 2019-12-31 RX ADMIN — PROPOFOL 50 MG: 10 INJECTION, EMULSION INTRAVENOUS at 11:52

## 2019-12-31 RX ADMIN — PROPOFOL 50 MG: 10 INJECTION, EMULSION INTRAVENOUS at 11:57

## 2019-12-31 RX ADMIN — PROPOFOL 50 MG: 10 INJECTION, EMULSION INTRAVENOUS at 11:54

## 2019-12-31 NOTE — H&P
Gastroenterology Outpatient History and Physical    Patient: Addis Quinonez    Physician: Mitchel Hill MD    Vital Signs: Blood pressure (!) 170/91, pulse 71, temperature 98.1 °F (36.7 °C), resp. rate 16, height 4' 9\" (1.448 m), weight 49.4 kg (109 lb), SpO2 98 %, not currently breastfeeding. Allergies:    Allergies   Allergen Reactions    Sulfa (Sulfonamide Antibiotics) Anaphylaxis    Aspirin Unknown (comments)     Upset stomach-- \" eats my stomach\"    Colestipol Other (comments)     Abdominal pain    Flagyl [Metronidazole] Nausea and Vomiting    Linzess [Linaclotide] Diarrhea    Tequin [Gatifloxacin] Nausea and Vomiting    Tramadol Nausea and Vomiting       Chief Complaint: LUQ Abdominal pain    History of Present Illness: As Above    Justification for Procedure: As Above    History:  Past Medical History:   Diagnosis Date    AAA (abdominal aortic aneurysm) ([facility])     6.6 cm-- per spouse-- pt needing to gain wt so she can have surg-- reason PEG being placed    Abnormal loss of weight 10/21/2015    Atrial fibrillation ([facility] [address]     followed by dr Danita Bui CAD (coronary atherosclerotic disease)     no mi/ no stents-- valve replaced 2008--followed by dr Wen Juarez [facility]) onset 1/2018    right lung --- surg only--- followed by dr Dixie Powell Chest pain, unspec 10/21/2015    occ--- followed by dr. Fina Owens    Cough     Depression 10/21/2015    Dizziness/Vertigo 10/21/2015    occ    GERD (gastroesophageal reflux disease)      not very well controlled    Hypertension, benign     controlled with med    Marijuana use     smokes most days per pt-- for pain    S/P aortic valve replacement with bioprosthetic valve     Thoracic aortic aneurysm ([address]     replaced---2008, 2009, 2010    Uses feeding tube       Past Surgical History:   Procedure Laterality Date    ABDOMEN SURGERY PROC UNLISTED  2000    stomach wrap    CARDIAC SURG PROCEDURE UNLIST  2008    Assending Aorta replaced    CARDIAC SURG PROCEDURE UNLIST  2009    Aortic arch replaced    CARDIAC SURG PROCEDURE UNLIST  2010    decending aorta replaced    HX CATARACT REMOVAL Bilateral     IOL    HX CHOLECYSTECTOMY      HX HERNIA REPAIR  2010    HX ORTHOPAEDIC  2005    3 vertabre fused--- cervical    HX ORTHOPAEDIC Right 2006    right shoulder rotater cuff    HX OTHER SURGICAL Right 2018    Nodule removed from lung    HX OTHER SURGICAL  2018    egd    HX TONSILLECTOMY      HX TRANSPLANT      aortic arch replaced with artificial, ascending and descending artificial parts    [place] EGD DELIVER THERMAL ENERGY SPHNCTR/CARDIA GERD  2017    VASCULAR SURGERY PROCEDURE UNLIST        Social History     Socioeconomic History    Marital status:      Spouse name: Not on file    Number of children: Not on file    Years of education: Not on file    Highest education level: Not on file   Tobacco Use    Smoking status: Former Smoker     Packs/day: 1.00     Years: 20.00     Pack years: 20.00     Last attempt to quit: 1993     Years since quittin.7    Smokeless tobacco: Never Used   Substance and Sexual Activity    Alcohol use: No    Drug use: Yes     Types: Marijuana     Comment: pt states uses for pain-- smokes daily      Family History   Problem Relation Age of Onset    Alcohol abuse Father     Cancer Sister        Medications:   Prior to Admission medications    Medication Sig Start Date End Date Taking? Authorizing Provider   LORazepam (ATIVAN) 0.5 mg tablet Take 0.5 mg by mouth nightly. Yes Provider, Historical   donepezil (ARICEPT) 10 mg tablet Take 10 mg by mouth nightly. Yes Provider, Historical   lisinopril (PRINIVIL, ZESTRIL) 20 mg tablet Take 20 mg by mouth daily. Yes Provider, Historical   QUEtiapine (SEROQUEL) 300 mg tablet Take 300 mg by mouth. Take 1 and half at night   Yes Provider, Historical   benzonatate (TESSALON) 100 mg capsule Take 100 mg by mouth two (2) times daily as needed for Cough.    Yes Provider, Historical   pitavastatin calcium (LIVALO) 1 mg tab tablet Take 1 mg by mouth daily. Yes Provider, Historical   metoprolol tartrate (LOPRESSOR) 25 mg tablet Take 25 mg by mouth daily. Yes Provider, Historical   omeprazole magnesium (PRILOSEC PO) Take 20 mg by mouth daily. Yes Provider, Historical   acetaminophen (TYLENOL) 325 mg tablet Take 500 mg by mouth nightly. Yes Provider, Historical   dicyclomine (BENTYL) 10 mg capsule Take 10 mg by mouth three (3) times daily as needed. Yes Provider, Historical   ondansetron HCl (ZOFRAN PO) Take  by mouth daily as needed. Yes Provider, Historical   alendronate (FOSAMAX) 70 mg tablet Take 70 mg by mouth every seven (7) days. Takes on sat   Yes Provider, Historical   HYDROcodone-homatropine (HYCODAN) 5-1.5 mg/5 mL syrup Take 5 mL by mouth four (4) times daily as needed. Yes Provider, Historical   albuterol (PROVENTIL HFA, VENTOLIN HFA, PROAIR HFA) 90 mcg/actuation inhaler Take 2 Puffs by inhalation every four (4) hours as needed. Yes Provider, Historical   triamcinolone (NASACORT) 55 mcg nasal inhaler 2 Sprays daily. Provider, Historical   OTHER Indications: prevagen daily    Provider, Historical   amoxicillin 500 mg tab Take  by mouth. As directed for dental visits    Provider, Historical   Magnesium Oxide 500 mg cap Take 500 mg by mouth daily as needed.     Provider, Historical       Physical Exam:         Heart: regular rate and rhythm, S1, S2 normal, no murmur, click, rub or gallop   Lungs: chest clear, no wheezing, rales, normal symmetric air entry, Heart exam - S1, S2 normal, no murmur, no gallop, rate regular   Abdominal: Bowel sounds are normal, Bowel sounds active, liver is not enlarged, spleen is not enlarged           Findings/Diagnosis: LUQ abdominal pain    EGD        Signed:  Mariann Sandoval MD 12/31/2019

## 2019-12-31 NOTE — ANESTHESIA POSTPROCEDURE EVALUATION
Procedure(s):  ESOPHAGOGASTRODUODENOSCOPY (EGD)/BMI 23  ESOPHAGOGASTRODUODENAL (EGD) BIOPSY. total IV anesthesia    Anesthesia Post Evaluation      Multimodal analgesia: multimodal analgesia used between 6 hours prior to anesthesia start to PACU discharge  Patient location during evaluation: bedside  Patient participation: complete - patient participated  Level of consciousness: awake and alert  Pain score: 3  Pain management: adequate  Airway patency: patent  Anesthetic complications: no  Cardiovascular status: acceptable and hemodynamically stable  Respiratory status: acceptable  Hydration status: acceptable  Post anesthesia nausea and vomiting:  none      Vitals Value Taken Time   /58 12/31/2019 12:08 PM   Temp 37 °C (98.6 °F) 12/31/2019 12:08 PM   Pulse 66 12/31/2019 12:08 PM   Resp 14 12/31/2019 12:08 PM   SpO2 98 % 12/31/2019 12:08 PM   Vitals shown include unvalidated device data.

## 2019-12-31 NOTE — PROCEDURES
Esophagogastroduodenoscopy    DATE of PROCEDURE: 12/31/2019    INDICATION:  1. LUQ Abdominal pain    POSTPROCEDURE DIAGNOSIS:  1. PEG tube  2. Bile reflux  3. Hiatal hernia  4. Gastritis    MEDICATIONS ADMINISTERED: MAC. Further details per anesthesia note. INSTRUMENT: [id number]    PROCEDURE:  After obtaining informed consent, the patient was placed in the left lateral position and sedated. The endoscope was advanced under direct vision without difficulty. The esophagus, stomach (including retroflexed views) and duodenum were evaluated. The patient was taken to the recovery area in stable condition. FINDINGS:  ESOPHAGUS: 1 cm hiatal hernia. Otherwise normal exam.  STOMACH: Mild bile reflux. PEG tube in good position. White scar tissue underneath internal bumper suggestive of recent ulceration. Random gastric biopsies for pathology. Otherwise normal exam.  DUODENUM: Normal exam.    Estimated blood loss: 0-minimal   Specimens obtained during procedure:   1. Gastric biopsies    PLAN:  1. Continue Omeprazole 20 mg PO BID  2. Carafate 1g PO QAC and QHS  3. Follow-up pathology  4.  Follow-up office in 3-4 weeks    Mayra Parkinson MD

## 2019-12-31 NOTE — DISCHARGE INSTRUCTIONS
Gastrointestinal Esophagogastroduodenoscopy (EGD) - Upper Exam Discharge Instructions    1. Call Dr. Dennise Yang at [phone] for any problems or questions. 2. Contact the doctor's office for follow up appointment as directed. 3. Medication may cause drowsiness for several hours, therefore:  · Do not drive or operate machinery for remainder of the day. · No alcohol today. · Do not make any important or legal decisions for 24 hours. · Do not sign any legal documents for 24 hours. 5. Ordinarily, you may resume regular diet and activity after exam unless otherwise              specified by your physician. 6. For mild soreness in your throat you may use Cepacol throat lozenges or warm               salt-water gargles as needed. Any additional instructions:    - Continue taking Omeprazole 20 mg twice a day  - Begin taking Carafate, per prescription  - Follow up in 3-4 weeks. Call [phone] to schedule an appointment     Instructions given to Fina Lopez and other family members.   Instructions given by:  Liat Dwyer RN

## 2019-12-31 NOTE — ANESTHESIA PREPROCEDURE EVALUATION
Anesthetic History   No history of anesthetic complications            Review of Systems / Medical History  Patient summary reviewed and pertinent labs reviewed    Pulmonary          Smoker (marijuana)         Neuro/Psych         Psychiatric history     Cardiovascular    Hypertension: well controlled  Valvular problems/murmurs (AVR 2008)      Dysrhythmias (h/o afib) : atrial fibrillation  CAD and PAD (6.6 cm AAA, s/p thoracic EVAR )    Exercise tolerance: <4 METS  Comments: S/p ascending, arch and descending aortic replacement. Denies angina or chest pain. 3METS. Preserved EF.     6 cm Thoracoabdominal aortic aneurysm awaiting weight gain to do surgry   GI/Hepatic/Renal     GERD: well controlled          Comments: Achalasia Endo/Other        Cancer (right lung)     Other Findings            Physical Exam    Airway  Mallampati: II  TM Distance: 4 - 6 cm  Neck ROM: normal range of motion        Cardiovascular    Rhythm: regular  Rate: normal         Dental    Dentition: Upper partial plate     Pulmonary  Breath sounds clear to auscultation               Abdominal         Other Findings            Anesthetic Plan    ASA: 4  Anesthesia type: total IV anesthesia          Induction: Intravenous  Anesthetic plan and risks discussed with: Patient

## 2019-12-31 NOTE — ROUTINE PROCESS
Pt discharged home with Spouse , spouse driving, VSS, instructions reviewed with patient and spouse , understanding verbalized. All belongings sent home with Pt. Pt transported out via wheelchair by [facility]  .

## 2020-07-10 ENCOUNTER — HOSPITAL ENCOUNTER (OUTPATIENT)
Dept: SURGERY | Age: 75
Discharge: HOME | End: 2020-07-10

## 2020-07-10 VITALS — WEIGHT: 109 LBS | HEIGHT: 57 IN | BODY MASS INDEX: 23.51 KG/M2

## 2020-07-10 NOTE — PERIOP NOTES
Patient verified name and . Order for consent  found in EHR and matches case posting; patient verifies procedure. Type 1B surgery, phone assessment complete. Orders  received. Labs per surgeon: none  Labs per anesthesia protocol: none      Patient answered medical/surgical history questions at their best of ability. All prior to admission medications documented in [facility]. Patient instructed to take the following medications the day of surgery according to anesthesia guidelines with a small sip of water:metoprolol,prilosec,seroquel,zofran if needed Hold all vitamins 7 days prior to surgery and NSAIDS 5 days prior to surgery. Prescription meds to hold:none    > 1 visitor allowed at this time. >Arrive at [facility], time of arrival to be called the day before by 1700  >NPO after midnight including gum, mints, and ice chips  >Responsible adult must drive patient to the hospital, stay during surgery, and patient will need supervision 24 hours after anesthesia  >Use dial in shower the night before surgery and on the morning of surgery  >All piercings must be removed prior to arrival.    >Leave all valuables (money and jewelry) at home but bring insurance card and ID on  DOS. >Do not wear make-up, nail polish, lotions, cologne, perfumes, powders, or oil on skin. Patient teach back successful and patient demonstrates knowledge of instruction.

## 2020-07-16 RX ORDER — ONDANSETRON 2 MG/ML
4 INJECTION INTRAMUSCULAR; INTRAVENOUS ONCE
Status: CANCELLED | OUTPATIENT
Start: 2020-07-16 | End: 2020-07-16

## 2020-07-16 RX ORDER — DIPHENHYDRAMINE HYDROCHLORIDE 50 MG/ML
12.5 INJECTION, SOLUTION INTRAMUSCULAR; INTRAVENOUS
Status: CANCELLED | OUTPATIENT
Start: 2020-07-16 | End: 2020-07-17

## 2020-07-16 RX ORDER — OXYCODONE HYDROCHLORIDE 5 MG/1
10 TABLET ORAL
Status: CANCELLED | OUTPATIENT
Start: 2020-07-16 | End: 2020-07-17

## 2020-07-16 RX ORDER — OXYCODONE HYDROCHLORIDE 5 MG/1
5 TABLET ORAL
Status: CANCELLED | OUTPATIENT
Start: 2020-07-16 | End: 2020-07-17

## 2020-07-16 RX ORDER — NALOXONE HYDROCHLORIDE 0.4 MG/ML
0.1 INJECTION, SOLUTION INTRAMUSCULAR; INTRAVENOUS; SUBCUTANEOUS AS NEEDED
Status: CANCELLED | OUTPATIENT
Start: 2020-07-16

## 2020-07-16 RX ORDER — SODIUM CHLORIDE, SODIUM LACTATE, POTASSIUM CHLORIDE, CALCIUM CHLORIDE 600; 310; 30; 20 MG/100ML; MG/100ML; MG/100ML; MG/100ML
100 INJECTION, SOLUTION INTRAVENOUS CONTINUOUS
Status: CANCELLED | OUTPATIENT
Start: 2020-07-16

## 2020-07-16 RX ORDER — ALBUTEROL SULFATE 0.83 MG/ML
2.5 SOLUTION RESPIRATORY (INHALATION) AS NEEDED
Status: CANCELLED | OUTPATIENT
Start: 2020-07-16

## 2020-07-16 RX ORDER — HYDROMORPHONE HYDROCHLORIDE 2 MG/ML
0.5 INJECTION, SOLUTION INTRAMUSCULAR; INTRAVENOUS; SUBCUTANEOUS
Status: CANCELLED | OUTPATIENT
Start: 2020-07-16

## 2020-07-17 ENCOUNTER — HOSPITAL ENCOUNTER (OUTPATIENT)
Age: 75
Setting detail: OUTPATIENT SURGERY
Discharge: HOME | End: 2020-07-17
Attending: INTERNAL MEDICINE | Admitting: INTERNAL MEDICINE
Payer: MEDICARE

## 2020-07-17 VITALS
HEIGHT: 57 IN | BODY MASS INDEX: 24.29 KG/M2 | HEART RATE: 85 BPM | TEMPERATURE: 99 F | WEIGHT: 112.6 LBS | OXYGEN SATURATION: 96 % | DIASTOLIC BLOOD PRESSURE: 65 MMHG | SYSTOLIC BLOOD PRESSURE: 129 MMHG | RESPIRATION RATE: 16 BRPM

## 2020-07-17 PROCEDURE — 99211 OFF/OP EST MAY X REQ PHY/QHP: CPT

## 2020-07-17 PROCEDURE — 99211 OFF/OP EST MAY X REQ PHY/QHP: CPT | Performed by: INTERNAL MEDICINE

## 2020-07-17 PROCEDURE — 76040000011

## 2020-07-17 PROCEDURE — 77030005103 HC CATH GASTMY BLN HALY -B: Performed by: INTERNAL MEDICINE

## 2020-07-17 PROCEDURE — 77030005103 HC CATH GASTMY BLN HALY -B

## 2020-07-17 PROCEDURE — 76040000019: Performed by: INTERNAL MEDICINE

## 2020-07-17 PROCEDURE — 74011250636 HC RX REV CODE- 250/636: Performed by: ANESTHESIOLOGY

## 2020-07-17 RX ORDER — SODIUM CHLORIDE, SODIUM LACTATE, POTASSIUM CHLORIDE, CALCIUM CHLORIDE 600; 310; 30; 20 MG/100ML; MG/100ML; MG/100ML; MG/100ML
100 INJECTION, SOLUTION INTRAVENOUS CONTINUOUS
Status: DISCONTINUED | OUTPATIENT
Start: 2020-07-17 | End: 2020-07-18 | Stop reason: HOSPADM

## 2020-07-17 RX ORDER — FENTANYL CITRATE 50 UG/ML
100 INJECTION, SOLUTION INTRAMUSCULAR; INTRAVENOUS ONCE
Status: DISCONTINUED | OUTPATIENT
Start: 2020-07-17 | End: 2020-07-18 | Stop reason: HOSPADM

## 2020-07-17 RX ORDER — LIDOCAINE HYDROCHLORIDE 10 MG/ML
0.1 INJECTION INFILTRATION; PERINEURAL AS NEEDED
Status: DISCONTINUED | OUTPATIENT
Start: 2020-07-17 | End: 2020-07-20 | Stop reason: HOSPADM

## 2020-07-17 RX ADMIN — SODIUM CHLORIDE, SODIUM LACTATE, POTASSIUM CHLORIDE, AND CALCIUM CHLORIDE 100 ML/HR: 600; 310; 30; 20 INJECTION, SOLUTION INTRAVENOUS at 13:10

## 2020-07-17 NOTE — PROGRESS NOTES
Left side PEG tube changed by Dr. Filipe Singh at this time. 8 mls of clear fluid removed from old PEG balloon and PEG removed without difficulty. New 20 Fr replacement PEG placed by Dr. Filipe Singh. 10 mls of sterile water was placed in balloon, by Dr. Filipe Singh. 3 cm noted at the level of the skin. Flushes easily without discomfort, per Dr. Filipe Singh.

## 2020-07-17 NOTE — H&P
Gastroenterology Outpatient History and Physical    Patient: Sigrid Finder    Physician: Melissa Sanchez MD    Vital Signs: Blood pressure 129/65, pulse 85, temperature 99 °F (37.2 °C), resp. rate 16, height 4' 9\" (1.448 m), weight 51.1 kg (112 lb 9.6 oz), SpO2 96 %. Allergies:    Allergies   Allergen Reactions    Sulfa (Sulfonamide Antibiotics) Anaphylaxis    Aspirin Unknown (comments)     Upset stomach-- \" eats my stomach\"    Cephalexin Myalgia     Facial swelling    Colestipol Other (comments)     Abdominal pain    Flagyl [Metronidazole] Nausea and Vomiting    Linzess [Linaclotide] Diarrhea    Tequin [Gatifloxacin] Nausea and Vomiting    Tramadol Nausea and Vomiting       Chief Complaint: PEG tube malfunction    History of Present Illness: As Above    Justification for Procedure: As Above    History:  Past Medical History:   Diagnosis Date    AAA (abdominal aortic aneurysm) (HCC)     6.6 cm-- per spouse-- pt needing to gain wt so she can have surg-- reason PEG being placed    Abnormal loss of weight 10/21/2015    Atrial fibrillation ([facility] [address]     followed by dr Arminda Bruno CAD (coronary atherosclerotic disease)     no mi/ no stents-- valve replaced 2008    Cancer [facility]) onset 1/2018    right lung --- surg only--- followed by dr Jeovanny Moreira Chest pain, unspec 10/21/2015    occ--- [facility]ology    Cough     Depression 10/21/2015    Dizziness/Vertigo 10/21/2015    occ    GERD (gastroesophageal reflux disease)      not very well controlled    Hypertension, benign     controlled with med    Marijuana use     smokes most days per pt-- for pain    S/P aortic valve replacement with bioprosthetic valve     Thoracic aortic aneurysm ([address]     replaced---2008, 2009, 2010    Uses feeding tube       Past Surgical History:   Procedure Laterality Date    ABDOMEN SURGERY PROC UNLISTED  2000    stomach wrap    CARDIAC SURG PROCEDURE UNLIST  2008    Assending Aorta replaced    CARDIAC SURG PROCEDURE UNLIST  2009    Aortic arch replaced    CARDIAC SURG PROCEDURE UNLIST  2010    decending aorta replaced    HX CATARACT REMOVAL Bilateral     IOL    HX CHOLECYSTECTOMY      HX HERNIA REPAIR  2010    HX ORTHOPAEDIC  2005    3 vertabre fused--- cervical    HX ORTHOPAEDIC Right 2006    right shoulder rotater cuff    HX OTHER SURGICAL Right 2018    Nodule removed from lung    HX OTHER SURGICAL  2018    egd    HX TONSILLECTOMY      HX TRANSPLANT      aortic arch replaced with artificial, ascending and descending artificial parts    [place] EGD DELIVER THERMAL ENERGY SPHNCTR/CARDIA GERD  2017    VASCULAR SURGERY PROCEDURE UNLIST        Social History     Socioeconomic History    Marital status:      Spouse name: Not on file    Number of children: Not on file    Years of education: Not on file    Highest education level: Not on file   Tobacco Use    Smoking status: Former Smoker     Packs/day: 1.00     Years: 20.00     Pack years: 20.00     Last attempt to quit: 1993     Years since quittin.2    Smokeless tobacco: Never Used   Substance and Sexual Activity    Alcohol use: No    Drug use: Yes     Types: Marijuana     Comment: pt states uses for pain-- smokes daily      Family History   Problem Relation Age of Onset    Alcohol abuse Father     Cancer Sister        Medications:   Prior to Admission medications    Medication Sig Start Date End Date Taking? Authorizing Provider   LORazepam (ATIVAN) 0.5 mg tablet Take 1 mg by mouth nightly. Yes Provider, Historical   donepezil (ARICEPT) 10 mg tablet Take 10 mg by mouth nightly. Yes Provider, Historical   lisinopril (PRINIVIL, ZESTRIL) 20 mg tablet Take 20 mg by mouth daily. Yes Provider, Historical   QUEtiapine (SEROQUEL) 300 mg tablet Take 300 mg by mouth two (2) times a day. Yes Provider, Historical   benzonatate (TESSALON) 100 mg capsule Take 100 mg by mouth two (2) times daily as needed for Cough.    Yes Provider, Historical   triamcinolone (NASACORT) 55 mcg nasal inhaler 2 Sprays daily. Yes Provider, Historical   OTHER Indications: prevagen daily   Yes Provider, Historical   pitavastatin calcium (LIVALO) 1 mg tab tablet Take 1 mg by mouth daily. Yes Provider, Historical   metoprolol tartrate (LOPRESSOR) 25 mg tablet Take 25 mg by mouth daily. Yes Provider, Historical   omeprazole magnesium (PRILOSEC PO) Take 20 mg by mouth two (2) times a day. Yes Provider, Historical   acetaminophen (TYLENOL) 325 mg tablet Take 500 mg by mouth nightly. Yes Provider, Historical   dicyclomine (BENTYL) 10 mg capsule Take 10 mg by mouth three (3) times daily as needed. Yes Provider, Historical   ondansetron HCl (ZOFRAN PO) Take  by mouth daily as needed. Yes Provider, Historical   alendronate (FOSAMAX) 70 mg tablet Take 70 mg by mouth every seven (7) days. Takes on sat   Yes Provider, Historical   amoxicillin 500 mg tab Take  by mouth. As directed for dental visits   Yes Provider, Historical   HYDROcodone-homatropine (HYCODAN) 5-1.5 mg/5 mL syrup Take 5 mL by mouth four (4) times daily as needed. Yes Provider, Historical   Magnesium Oxide 500 mg cap Take 500 mg by mouth daily as needed. Yes Provider, Historical   albuterol (PROVENTIL HFA, VENTOLIN HFA, PROAIR HFA) 90 mcg/actuation inhaler Take 2 Puffs by inhalation every four (4) hours as needed.     Provider, Historical       Physical Exam:         Heart: regular rate and rhythm, S1, S2 normal, no murmur, click, rub or gallop   Lungs: chest clear, no wheezing, rales, normal symmetric air entry, Heart exam - S1, S2 normal, no murmur, no gallop, rate regular   Abdominal: Bowel sounds are normal, Bowel sounds active, liver is not enlarged, spleen is not enlarged           Findings/Diagnosis: PEG tube malfunction    PEG tube change        Signed:  Mehul Escoto MD 7/17/2020

## 2020-07-17 NOTE — PROCEDURES
PEG Tube Change    INDICATION:   1. PEG tube malfunction    PROCEDURE:  Existing 20 Fr PEG removed. A 20 Fr replacement PEG was inserted. Internal balloon filled with 10 mL sterile water. External bolster fixed at 3 cm. Insufflation with 60 cc syringe to confirm position. PEG tube flushes easily. Erythema of surrounding skin noted. COMPLICATIONS: none    EBL: negative    PLAN:  1.  Resume PEG tube feeds and routine PEG tube care    Jose L Perry MD

## 2020-07-17 NOTE — DISCHARGE INSTRUCTIONS
Patient Education        Percutaneous Endoscopic Gastrostomy: What to Expect at [facility]  A percutaneous endoscopic gastrostomy is a procedure to make an opening between the skin of your belly and your stomach. The doctor put a thin tube called a gastrostomy tube (also called G-tube, PEG tube, or feeding tube) into your stomach through the opening. The tube can put liquid nutrition, fluid, and medicines directly into your stomach. The tube also may be used to drain liquid or air from the stomach. Your belly may feel sore, like you pulled a muscle, for several days. Your doctor will give you pain medicine for this. It will take about a week for the skin around your feeding tube to heal. You may have some yellowish mucus where the feeding tube comes out of your belly. This is normal and is not a sign of infection. You will need to learn how to use and care for your feeding tube. Your doctor may recommend that you have a nurse or dietitian visit you at home to help you get started with your feeding tube. At first you may need a friend or family member to help you with your tube feedings. But with practice, you may be able to do it yourself. A feeding tube can break down over time. If this happens, the tube will be removed and replaced. Sometimes a tube is removed if you have an infection that is getting worse. Sometimes a tube will come out by itself. Your doctor will give you instructions about what to do if this happens. This care sheet gives you a general idea about how long it will take for you to recover. But each person recovers at a different pace. Follow the steps below to feel better as quickly as possible. How can you care for yourself at home? Activity  · Rest when you feel tired. Getting enough sleep will help you recover. · Try to walk each day. Start by walking a little more than you did the day before. Bit by bit, increase the amount you walk.  Walking boosts blood flow and helps prevent pneumonia and constipation. · Ask your doctor when you can drive again. · Until your doctor says it is okay, avoid lifting anything that would make you strain. This may include heavy grocery bags and milk containers, a heavy briefcase or backpack, cat litter or dog food bags, a vacuum , or a child. · You can shower as usual. Pat dry the skin around your feeding tube. Do not take a bath unless your doctor tells you it is okay. Diet  · Follow your doctor's instructions about eating and drinking. · Follow your doctor's instructions about what nutrition and fluids to feed through your tube. Medicines  · Your doctor will tell you if and when you can restart your medicines. He or she will also give you instructions about taking any new medicines. · If you take aspirin or some other blood thinner, ask your doctor if and when to start taking it again. Make sure that you understand exactly what your doctor wants you to do. · If you take medicine through your feeding tube:  ? Follow exactly your doctor's instructions about how to do this. Do not try to put whole pills in your feeding tube. They may get stuck. Ask your doctor if liquid medicine is available. ? Do not mix your medicine with tube-feeding formula. This can cause a clog in the feeding tube. ? Do not put more than one medicine down your feeding tube at a time. ? Flush the tube with water before and after you put each medicine down your tube. · Be safe with medicines. Take pain medicines exactly as directed. ? If the doctor gave you a prescription medicine for pain, take it as prescribed. ? If you are not taking a prescription pain medicine, ask your doctor if you can take an over-the-counter medicine. ? Do not take two or more pain medicines at the same time unless the doctor told you to. Many pain medicines have acetaminophen, which is Tylenol. Too much acetaminophen (Tylenol) can be harmful.   · If you think your pain medicine is making you sick to your stomach:  ? Take your pain medicine after meals (unless your doctor has told you not to). ? Ask your doctor for a different pain medicine. · If your doctor prescribed antibiotics, take them as directed. Do not stop taking them just because you feel better. You need to take the full course of antibiotics. Incision care  · Your doctor or nurse will show you how to care for the skin around the tube. Be sure to follow the instructions on keeping the area clean. · Wash the skin around your feeding tube daily with warm, soapy water, and pat it dry. Other cleaning products, such as hydrogen peroxide, can make the wound heal more slowly. You may cover the area with a gauze bandage if it weeps or rubs against clothing. Change the bandage every day. · Keep the area clean and dry. Using your feeding tube  · Follow your doctor's instructions about using the feeding tube. Your doctor or nurse will:  ? Tell you what to put through the tube. ? Teach you how to watch for a leaking tube, infection at the tube site, or a clog in the tube. ? Tell you what activities you can do. · Keep your feeding tube clamped unless you are using it. · Keep the tube taped to your skin at all times, and leave some slack in the tube. This helps prevent pain and keeps the tube from coming out. · Wash your hands before you handle the tube and tube-feeding formula. Wash the top of the can of formula before you open it. · Keep the formula in the refrigerator after you open it. Do not let the formula sit at room temperature for more than 8 hours. · Sit up or keep your head up during the feeding and for 30 minutes after. · If you feel sick to your stomach or have stomach cramps during the tube feeding, slow the rate that the formula comes through the tube. Then gradually increase the rate as you can tolerate it. Follow-up care is a key part of your treatment and safety.  Be sure to make and go to all appointments, and call your doctor if you are having problems. It's also a good idea to know your test results and keep a list of the medicines you take. When should you call for help? [id number] anytime you think you may need emergency care. For example, call if:  · You pass out (lose consciousness). · You have severe trouble breathing. · You have sudden chest pain and shortness of breath, or you cough up blood. · You have severe belly pain. Call your doctor now or seek immediate medical care if:  · You have pain that does not get better after you take pain medicine. · You have a fever over 100°F.  · You have signs of infection, such as:  ? Increased pain, swelling, warmth, or redness. ? Red streaks leading from the area. ? Pus draining from the area. ? A fever. · The stitches that hold the feeding tube in place are loose or come out. · Your feeding tube comes out. · Your feeding tube is clogged, or liquid will not go down the tube. · You cough a lot or have other trouble during tube feedings. · You have nausea, vomiting, or diarrhea. Watch closely for any changes in your health, and be sure to contact your doctor if:  · You have any problems with your feeding tube. Where can you learn more? Go to http://www.gray.com/  Enter [id number] in the search box to learn more about \"Percutaneous Endoscopic Gastrostomy: What to Expect at Home. \"  Current as of: August 12, 2019               Content Version: 12.5  © 2006-2020 [employer]. Care instructions adapted under license by [employer] (which disclaims liability or warranty for this information). If you have questions about a medical condition or this instruction, always ask your healthcare professional. [place] [address] any warranty or liability for your use of this information.   After general anesthesia or intravenous sedation, for 24 hours or while taking prescription Narcotics:  · Limit your activities  · A responsible adult needs to be with you for the next 24 hours  · Do not drive and operate hazardous machinery  · Do not make important personal or business decisions  · Do not drink alcoholic beverages  · If you have not urinated within 8 hours after discharge, please contact your surgeon on call. · If you have sleep apnea and have a CPAP machine, please use it for all naps and sleeping. · Please use caution when taking narcotics and any of your home medications that may cause drowsiness. *  Please give a list of your current medications to your Primary Care Provider. *  Please update this list whenever your medications are discontinued, doses are      changed, or new medications (including over-the-counter products) are added. *  Please carry medication information at all times in case of emergency situations. These are general instructions for a healthy lifestyle:  No smoking/ No tobacco products/ Avoid exposure to second hand smoke  Surgeon General's Warning:  Quitting smoking now greatly reduces serious risk to your health. Obesity, smoking, and sedentary lifestyle greatly increases your risk for illness  A healthy diet, regular physical exercise & weight monitoring are important for maintaining a healthy lifestyle    You may be retaining fluid if you have a history of heart failure or if you experience any of the following symptoms:  Weight gain of 3 pounds or more overnight or 5 pounds in a week, increased swelling in our hands or feet or shortness of breath while lying flat in bed. Please call your doctor as soon as you notice any of these symptoms; do not wait until your next office visit.

## 2020-11-06 NOTE — PROGRESS NOTES
Called pt at this time to remind her of scheduled appointment on Monday. Spoke to pt's  a this time. He verbalized that he wasn't sure why the pt and himself needed to arrive early. Educated pt's  on the importance of early arrival time. Pt's  verbalized understanding after education.

## 2020-11-09 ENCOUNTER — HOSPITAL ENCOUNTER (OUTPATIENT)
Age: 75
Setting detail: OUTPATIENT SURGERY
Discharge: HOME | End: 2020-11-09
Attending: INTERNAL MEDICINE | Admitting: INTERNAL MEDICINE
Payer: MEDICARE

## 2020-11-09 VITALS
RESPIRATION RATE: 16 BRPM | WEIGHT: 110 LBS | TEMPERATURE: 98.2 F | HEIGHT: 57 IN | SYSTOLIC BLOOD PRESSURE: 158 MMHG | BODY MASS INDEX: 23.73 KG/M2 | DIASTOLIC BLOOD PRESSURE: 70 MMHG | HEART RATE: 67 BPM | OXYGEN SATURATION: 94 %

## 2020-11-09 PROCEDURE — 77030005103 HC CATH GASTMY BLN HALY -B: Performed by: INTERNAL MEDICINE

## 2020-11-09 PROCEDURE — 76010000226 HC SPECIAL PROCEDURE AMB SURG

## 2020-11-09 PROCEDURE — 99211 OFF/OP EST MAY X REQ PHY/QHP: CPT | Performed by: INTERNAL MEDICINE

## 2020-11-09 NOTE — PROCEDURES
PEG Tube Replacement    INDICATION:   1. PEG tube malfunction    PROCEDURE:  1. Bedside PEG tube replacement    Medication: none    EBL: negative    COMPLICATIONS: none    PROCEDURE:   Existing PEG tube removed. Replaced with a 20 Fr PEG tube. Internal bumper filled with 10 mL of sterile water. External bolster secured at 3 cm. Placement confirmed with auscultation while insufflating stomach with 60 CC syringe. Dressing placed. RECOMMENDATIONS:   1. May resume PEG tube use immediately  2. Routine PEG tube care  3.  Follow-up in office MILKA Spear MD

## 2020-11-09 NOTE — H&P
Gastroenterology Outpatient History and Physical    Patient: Bre Camachojose    Physician: Davon Davila MD    Vital Signs: Blood pressure (!) 168/89, pulse 70, temperature 98.2 °F (36.8 °C), resp. rate 16, height 4' 9\" (1.448 m), weight 49.9 kg (110 lb), SpO2 97 %. Allergies:    Allergies   Allergen Reactions    Sulfa (Sulfonamide Antibiotics) Anaphylaxis    Aspirin Unknown (comments)     Upset stomach-- \" eats my stomach\"    Cephalexin Myalgia     Facial swelling    Colestipol Other (comments)     Abdominal pain    Flagyl [Metronidazole] Nausea and Vomiting    Linzess [Linaclotide] Diarrhea    Tequin [Gatifloxacin] Nausea and Vomiting    Tramadol Nausea and Vomiting       Chief Complaint: PEG change    History of Present Illness: As Above    Justification for Procedure: As Above    History:  Past Medical History:   Diagnosis Date    AAA (abdominal aortic aneurysm) (HCC)     6.6 cm-- per spouse-- pt needing to gain wt so she can have surg-- reason PEG being placed    Abnormal loss of weight 10/21/2015    Atrial fibrillation ([facility] [address]     followed by dr Ines Lange CAD (coronary atherosclerotic disease)     no mi/ no stents-- valve replaced 2008    Cancer [facility]) onset 1/2018    right lung --- surg only--- followed by dr Loyd Deutsch Chest pain, unspec 10/21/2015    occ--- [facility] by [facility] cardiology    Cough     Depression 10/21/2015    Dizziness/Vertigo 10/21/2015    occ    GERD (gastroesophageal reflux disease)      not very well controlled    Hypertension, benign     controlled with med    Marijuana use     smokes most days per pt-- for pain    S/P aortic valve replacement with bioprosthetic valve     Thoracic aortic aneurysm ([address]     replaced---2008, 2009, 2010    Uses feeding tube       Past Surgical History:   Procedure Laterality Date    ABDOMEN SURGERY PROC UNLISTED  2000    stomach wrap    CARDIAC SURG PROCEDURE UNLIST  2008    Assending Aorta replaced    CARDIAC SURG PROCEDURE UNLIST  2009    Aortic arch replaced    CARDIAC SURG PROCEDURE UNLIST  2010    decending aorta replaced    HX CATARACT REMOVAL Bilateral     IOL    HX CHOLECYSTECTOMY      HX HERNIA REPAIR  2010    HX ORTHOPAEDIC  2005    3 vertabre fused--- cervical    HX ORTHOPAEDIC Right 2006    right shoulder rotater cuff    HX OTHER SURGICAL Right 2018    Nodule removed from lung    HX OTHER SURGICAL  2018    egd    HX TONSILLECTOMY      HX TRANSPLANT      aortic arch replaced with artificial, ascending and descending artificial parts    [place] EGD DELIVER THERMAL ENERGY SPHNCTR/CARDIA GERD  2017    VASCULAR SURGERY PROCEDURE UNLIST        Social History     Socioeconomic History    Marital status:      Spouse name: Not on file    Number of children: Not on file    Years of education: Not on file    Highest education level: Not on file   Tobacco Use    Smoking status: Former Smoker     Packs/day: 1.00     Years: 20.00     Pack years: 20.00     Last attempt to quit: 1993     Years since quittin.5    Smokeless tobacco: Never Used   Substance and Sexual Activity    Alcohol use: No    Drug use: Yes     Types: Marijuana     Comment: pt states uses for pain-- smokes daily      Family History   Problem Relation Age of Onset    Alcohol abuse Father     Cancer Sister        Medications:   Prior to Admission medications    Medication Sig Start Date End Date Taking? Authorizing Provider   LORazepam (ATIVAN) 0.5 mg tablet Take 1 mg by mouth nightly. Yes Provider, Historical   donepezil (ARICEPT) 10 mg tablet Take 10 mg by mouth nightly. Yes Provider, Historical   lisinopril (PRINIVIL, ZESTRIL) 20 mg tablet Take 20 mg by mouth daily. Yes Provider, Historical   QUEtiapine (SEROQUEL) 300 mg tablet Take 300 mg by mouth two (2) times a day. Yes Provider, Historical   benzonatate (TESSALON) 100 mg capsule Take 100 mg by mouth two (2) times daily as needed for Cough.    Yes Provider, Historical triamcinolone (NASACORT) 55 mcg nasal inhaler 2 Sprays daily. Yes Provider, Historical   OTHER Indications: prevagen daily   Yes Provider, Historical   pitavastatin calcium (LIVALO) 1 mg tab tablet Take 1 mg by mouth daily. Yes Provider, Historical   metoprolol tartrate (LOPRESSOR) 25 mg tablet Take 25 mg by mouth daily. Yes Provider, Historical   omeprazole magnesium (PRILOSEC PO) Take 20 mg by mouth two (2) times a day. Yes Provider, Historical   acetaminophen (TYLENOL) 325 mg tablet Take 500 mg by mouth nightly. Yes Provider, Historical   dicyclomine (BENTYL) 10 mg capsule Take 10 mg by mouth three (3) times daily as needed. Yes Provider, Historical   ondansetron HCl (ZOFRAN PO) Take  by mouth daily as needed. Yes Provider, Historical   alendronate (FOSAMAX) 70 mg tablet Take 70 mg by mouth every seven (7) days. Takes on sat   Yes Provider, Historical   HYDROcodone-homatropine (HYCODAN) 5-1.5 mg/5 mL syrup Take 5 mL by mouth four (4) times daily as needed. Yes Provider, Historical   albuterol (PROVENTIL HFA, VENTOLIN HFA, PROAIR HFA) 90 mcg/actuation inhaler Take 2 Puffs by inhalation every four (4) hours as needed. Yes Provider, Historical   Magnesium Oxide 500 mg cap Take 500 mg by mouth daily as needed. Yes Provider, Historical   amoxicillin 500 mg tab Take  by mouth.  As directed for dental visits    Provider, Historical       Physical Exam:         Heart: regular rate and rhythm, S1, S2 normal, no murmur, click, rub or gallop   Lungs: chest clear, no wheezing, rales, normal symmetric air entry, Heart exam - S1, S2 normal, no murmur, no gallop, rate regular   Abdominal: Bowel sounds are normal, Bowel sounds active, liver is not enlarged, spleen is not enlarged           Findings/Diagnosis: PEG tube malfunction    PEG change        Signed:  Milady Lutz MD 11/9/2020

## 2020-12-01 ENCOUNTER — ANESTHESIA EVENT (OUTPATIENT)
Dept: ENDOSCOPY | Age: 75
End: 2020-12-01
Payer: MEDICARE

## 2020-12-02 ENCOUNTER — ANESTHESIA (OUTPATIENT)
Dept: ENDOSCOPY | Age: 75
End: 2020-12-02
Payer: MEDICARE

## 2020-12-02 ENCOUNTER — HOSPITAL ENCOUNTER (OUTPATIENT)
Age: 75
Setting detail: OUTPATIENT SURGERY
Discharge: HOME | End: 2020-12-02
Attending: INTERNAL MEDICINE | Admitting: INTERNAL MEDICINE
Payer: MEDICARE

## 2020-12-02 VITALS
OXYGEN SATURATION: 95 % | RESPIRATION RATE: 18 BRPM | BODY MASS INDEX: 23.73 KG/M2 | HEART RATE: 61 BPM | TEMPERATURE: 97.7 F | DIASTOLIC BLOOD PRESSURE: 59 MMHG | HEIGHT: 57 IN | SYSTOLIC BLOOD PRESSURE: 130 MMHG | WEIGHT: 110 LBS

## 2020-12-02 PROCEDURE — 88305 TISSUE EXAM BY PATHOLOGIST: CPT

## 2020-12-02 PROCEDURE — 74011000250 HC RX REV CODE- 250: Performed by: REGISTERED NURSE

## 2020-12-02 PROCEDURE — 74011250636 HC RX REV CODE- 250/636: Performed by: ANESTHESIOLOGY

## 2020-12-02 PROCEDURE — 74011250636 HC RX REV CODE- 250/636: Performed by: REGISTERED NURSE

## 2020-12-02 PROCEDURE — 77030021593 HC FCPS BIOP ENDOSC BSC -A: Performed by: INTERNAL MEDICINE

## 2020-12-02 PROCEDURE — 76040000025: Performed by: INTERNAL MEDICINE

## 2020-12-02 PROCEDURE — 76060000032 HC ANESTHESIA 0.5 TO 1 HR: Performed by: INTERNAL MEDICINE

## 2020-12-02 PROCEDURE — 2709999900 HC NON-CHARGEABLE SUPPLY: Performed by: INTERNAL MEDICINE

## 2020-12-02 RX ORDER — SODIUM CHLORIDE, SODIUM LACTATE, POTASSIUM CHLORIDE, CALCIUM CHLORIDE 600; 310; 30; 20 MG/100ML; MG/100ML; MG/100ML; MG/100ML
100 INJECTION, SOLUTION INTRAVENOUS CONTINUOUS
Status: DISCONTINUED | OUTPATIENT
Start: 2020-12-02 | End: 2020-12-02 | Stop reason: HOSPADM

## 2020-12-02 RX ORDER — LIDOCAINE HYDROCHLORIDE 20 MG/ML
INJECTION, SOLUTION EPIDURAL; INFILTRATION; INTRACAUDAL; PERINEURAL AS NEEDED
Status: DISCONTINUED | OUTPATIENT
Start: 2020-12-02 | End: 2020-12-02 | Stop reason: HOSPADM

## 2020-12-02 RX ORDER — PROPOFOL 10 MG/ML
INJECTION, EMULSION INTRAVENOUS
Status: DISCONTINUED | OUTPATIENT
Start: 2020-12-02 | End: 2020-12-02 | Stop reason: HOSPADM

## 2020-12-02 RX ORDER — PROPOFOL 10 MG/ML
INJECTION, EMULSION INTRAVENOUS AS NEEDED
Status: DISCONTINUED | OUTPATIENT
Start: 2020-12-02 | End: 2020-12-02 | Stop reason: HOSPADM

## 2020-12-02 RX ADMIN — PROPOFOL 20 MG: 10 INJECTION, EMULSION INTRAVENOUS at 11:39

## 2020-12-02 RX ADMIN — PROPOFOL 75 MCG/KG/MIN: 10 INJECTION, EMULSION INTRAVENOUS at 11:37

## 2020-12-02 RX ADMIN — PROPOFOL 20 MG: 10 INJECTION, EMULSION INTRAVENOUS at 11:41

## 2020-12-02 RX ADMIN — PHENYLEPHRINE HYDROCHLORIDE 100 MCG: 10 INJECTION INTRAVENOUS at 11:37

## 2020-12-02 RX ADMIN — PROPOFOL 20 MG: 10 INJECTION, EMULSION INTRAVENOUS at 11:48

## 2020-12-02 RX ADMIN — PROPOFOL 20 MG: 10 INJECTION, EMULSION INTRAVENOUS at 11:37

## 2020-12-02 RX ADMIN — LIDOCAINE HYDROCHLORIDE 40 MG: 20 INJECTION, SOLUTION EPIDURAL; INFILTRATION; INTRACAUDAL; PERINEURAL at 11:37

## 2020-12-02 RX ADMIN — SODIUM CHLORIDE, SODIUM LACTATE, POTASSIUM CHLORIDE, AND CALCIUM CHLORIDE 100 ML/HR: 600; 310; 30; 20 INJECTION, SOLUTION INTRAVENOUS at 11:25

## 2020-12-02 NOTE — ANESTHESIA PREPROCEDURE EVALUATION
Anesthetic History   No history of anesthetic complications            Review of Systems / Medical History  Patient summary reviewed and pertinent labs reviewed    Pulmonary          Smoker (marijuana)         Neuro/Psych         Psychiatric history     Cardiovascular    Hypertension: well controlled  Valvular problems/murmurs (AVR 2008)      Dysrhythmias (h/o afib) : atrial fibrillation  CAD and PAD (6.6 cm AAA, s/p thoracic EVAR )    Exercise tolerance: <4 METS: Denied recent chest pain, SOB, syncope  Comments: S/p ascending, arch and descending aortic replacement.      6 cm Thoracoabdominal aortic aneurysm awaiting weight gain to do surgery    Nuclear stress 2019 - normal perfusion, nomral LV function     Echo 2019 - normal LV function, normal functioning bioprosthetic valve   GI/Hepatic/Renal     GERD: well controlled          Comments: Achalasia Endo/Other        Cancer (right lung)     Other Findings              Physical Exam    Airway  Mallampati: II  TM Distance: 4 - 6 cm  Neck ROM: normal range of motion   Mouth opening: Normal     Cardiovascular    Rhythm: regular  Rate: normal         Dental    Dentition: Upper partial plate and Poor dentition     Pulmonary  Breath sounds clear to auscultation               Abdominal  GI exam deferred       Other Findings            Anesthetic Plan    ASA: 4  Anesthesia type: total IV anesthesia          Induction: Intravenous  Anesthetic plan and risks discussed with: Patient

## 2020-12-02 NOTE — ANESTHESIA POSTPROCEDURE EVALUATION
Procedure(s):  COLONOSCOPY/ 24  COLON BIOPSY  ENDOSCOPIC POLYPECTOMY. total IV anesthesia    Anesthesia Post Evaluation        Patient location during evaluation: PACU  Patient participation: complete - patient participated  Level of consciousness: awake and alert  Pain management: adequate  Airway patency: patent  Anesthetic complications: no  Cardiovascular status: acceptable  Respiratory status: acceptable  Hydration status: acceptable  Post anesthesia nausea and vomiting:  controlled  Final Post Anesthesia Temperature Assessment:  Normothermia (36.0-37.5 degrees C)      INITIAL Post-op Vital signs:   Vitals Value Taken Time   /59 12/2/2020 12:36 PM   Temp 36.5 °C (97.7 °F) 12/2/2020 12:06 PM   Pulse 62 12/2/2020 12:41 PM   Resp 18 12/2/2020 12:06 PM   SpO2 96 % 12/2/2020 12:40 PM   Vitals shown include unvalidated device data.

## 2020-12-02 NOTE — DISCHARGE INSTRUCTIONS
Gastrointestinal Colonoscopy/Flexible Sigmoidoscopy - Lower Exam Discharge Instructions  1. Call Dr. Metz Back at [phone] for any problems or questions. 2. Contact the doctors office for follow up appointment as directed  3. Medication may cause drowsiness for several hours, therefore:  · Do not drive or operate machinery for reminder of the day. · No alcohol today. · Do not make any important or legal decisions for 24 hours. · Do not sign any legal documents for 24 hours. 4. Ordinarily, you may resume regular diet and activity after exam unless otherwise specified by your physician. 5. Because of air put into your colon during exam, you may experience some abdominal distension, relieved by the passage of gas, for several hours. 6. Contact your physician if you have any of the following:  · Excessive amount of bleeding - large amount when having a bowel movement. Occasional specks of blood with bowel movement would not be unusual.  · Severe abdominal pain  · Fever or Chills  7. Polyp Removal - follow these additional instructions  · Take Metamucil - 1 tablespoon in juice every morning for 3 days  · No Aspirin, Advil, Aleve, Nuprin, Ibuprofen, or medications that contain these drugs for 2 weeks. Any additional instructions:  ***      Instructions given to So Sanabria and other family members.   Instructions given by:  Kermit Carr

## 2020-12-02 NOTE — PROCEDURES
COLONOSCOPY    DATE of PROCEDURE: 12/2/2020    MEDICATION:  MAC      INSTRUMENT: PCF    PROCEDURE: After obtaining informed consent, the patient was placed in the left lateral position and sedated. The endoscope was advanced to the hepatic flexure. On withdrawal, the colon was carefully visualized in a 360 degree fashion. Retroflexion was performed in the rectum. The patient was taken to the recovery area in stable condition. FINDINGS:  Rectum: normal except 3mm polyp removed with cold forceps  Sigmoid: Tortuous fixed sigmoid colon that would not allow passage of PCF. An egd scope used to traverse this area and significant diverticulosis noted. No colitis or masses noted. Descending Colon: normal - random biopsies taken throughout the colon  Transverse Colon: normal  Ascending Colon: not able to be seen despite EGD scope being advanced it full length. Cecum: not seen  Terminal ileum: not entered    Estimated blood loss: 0-minimal         ASSESSMENT/PLAN:  1. F/up pathology for microscopic colitis and polyp  2. F/up with Dr David Murrieta in the office. 3. Consider CT colonography to evaluate right colon. Last two colonoscopies only successful till sigmoid colon  4.  Imodium 4mg BID to start for diarrhea and consider Derinda Sicard, MD  [facility], [place]

## 2020-12-02 NOTE — ROUTINE PROCESS
VSS. No complaints noted. Education given and reviewed with . Pt wheeled out to car via wheelchair by [facility].

## 2020-12-02 NOTE — H&P
Gastroenterology Outpatient History and Physical    Patient: Salima Santana    Physician: Yolande Kate MD    Vital Signs: There were no vitals taken for this visit. Allergies: Allergies   Allergen Reactions    Sulfa (Sulfonamide Antibiotics) Anaphylaxis    Aspirin Unknown (comments)     Upset stomach-- \" eats my stomach\"    Cephalexin Myalgia     Facial swelling    Colestipol Other (comments)     Abdominal pain    Flagyl [Metronidazole] Nausea and Vomiting    Linzess [Linaclotide] Diarrhea    Tequin [Gatifloxacin] Nausea and Vomiting    Tramadol Nausea and Vomiting       Chief Complaint: diarrhea    History of Present Illness: Patient is a 67 yo female with h/o depression/anxiety, HTN, HLD, lactose intolerance, celiac disease, asthma, and AAA who is here for evaluation of diarrhea. Pts last colonoscopy was in 2006 and  2011 and scope could not be passed farther than descending colon. .    Justification for Procedure: eval for colitis    History:  Past Medical History:   Diagnosis Date    AAA (abdominal aortic aneurysm) ([facility] [address]     6.6 cm-- per spouse-- states pt isnt \" strong enough\" for surg-- followed by dr Anthony Lezama Abnormal loss of weight 10/21/2015    Atrial fibrillation ([facility] [address]     followed by dr Mary Kate Giang CAD (coronary atherosclerotic disease)     no mi/ no stents-- valve replaced 2008--- followed by dr Jarrell [facility] [facility]) onset 1/2018    right lung --- surg only--- followed by dr Vasu Yadav Chest pain, unspec 10/21/2015    occ--- followed by [facility] cardiology    Chronic cough     Chronic pain     stomach    Depression 10/21/2015    Dizziness/Vertigo 10/21/2015    occ    GERD (gastroesophageal reflux disease)      not very well controlled    Hypertension, benign     controlled with med    Marijuana use     smokes most days per pt-- for pain    S/P aortic valve replacement with bioprosthetic valve 2008    Thoracic aortic aneurysm ([address]     replaced---2008, 2009, 2010    Uses feeding tube placed 2019    bolus 3 times daily--- pt states still able to take a few bites      Past Surgical History:   Procedure Laterality Date    ABDOMEN SURGERY PROC UNLISTED      stomach wrap    CARDIAC SURG PROCEDURE UNLIST      Assending Aorta replaced    CARDIAC SURG PROCEDURE UNLIST      Aortic arch replaced    CARDIAC SURG PROCEDURE UNLIST      decending aorta replaced    HX CATARACT REMOVAL Bilateral     IOL    HX CHOLECYSTECTOMY      HX HERNIA REPAIR  2010    HX ORTHOPAEDIC  2005    3 vertabre fused--- cervical    HX ORTHOPAEDIC Right 2006    right shoulder rotater cuff    HX OTHER SURGICAL Right 2018    Nodule removed from lung    HX OTHER SURGICAL  2018    egd    HX TONSILLECTOMY      HX TRANSPLANT      aortic arch replaced with artificial, ascending and descending artificial parts    DC EGD DELIVER THERMAL ENERGY SPHNCTR/CARDIA GERD  2017    VASCULAR SURGERY PROCEDURE UNLIST        Social History     Socioeconomic History    Marital status:      Spouse name: Not on file    Number of children: Not on file    Years of education: Not on file    Highest education level: Not on file   Tobacco Use    Smoking status: Former Smoker     Packs/day: 1.00     Years: 20.00     Pack years: 20.00     Last attempt to quit: 1993     Years since quittin.6    Smokeless tobacco: Never Used   Substance and Sexual Activity    Alcohol use: No    Drug use: Yes     Types: Marijuana     Comment: pt states uses for pain-- smokes daily      Family History   Problem Relation Age of Onset    Alcohol abuse Father     Cancer Sister     Cancer Brother        Medications:   Prior to Admission medications    Medication Sig Start Date End Date Taking? Authorizing Provider   LORazepam (ATIVAN) 0.5 mg tablet Take 1 mg by mouth nightly. Provider, Historical   donepezil (ARICEPT) 10 mg tablet Take 10 mg by mouth nightly.     Provider, Historical   lisinopril (PRINIVIL, ZESTRIL) 20 mg tablet Take 20 mg by mouth daily. Provider, Historical   QUEtiapine (SEROQUEL) 300 mg tablet Take 300 mg by mouth two (2) times a day. Provider, Historical   benzonatate (TESSALON) 100 mg capsule Take 100 mg by mouth two (2) times daily as needed for Cough. Provider, Historical   triamcinolone (NASACORT) 55 mcg nasal inhaler 2 Sprays by Both Nostrils route daily as needed. Provider, Historical   pitavastatin calcium (LIVALO) 1 mg tab tablet Take 1 mg by mouth daily. Provider, Historical   metoprolol tartrate (LOPRESSOR) 25 mg tablet Take 25 mg by mouth daily. Provider, Historical   omeprazole magnesium (PRILOSEC PO) Take 20 mg by mouth two (2) times a day. Provider, Historical   acetaminophen (TYLENOL) 325 mg tablet Take 500 mg by mouth nightly. Provider, Historical   dicyclomine (BENTYL) 10 mg capsule Take 10 mg by mouth three (3) times daily. Provider, Historical   ondansetron HCl (ZOFRAN PO) Take  by mouth daily as needed. Provider, Historical   alendronate (FOSAMAX) 70 mg tablet Take 70 mg by mouth every seven (7) days. Takes on sat    Provider, Historical   amoxicillin 500 mg tab Take  by mouth. As directed for dental visits    Provider, Historical   HYDROcodone-homatropine (HYCODAN) 5-1.5 mg/5 mL syrup Take 5 mL by mouth four (4) times daily as needed. Provider, Historical   albuterol (PROVENTIL HFA, VENTOLIN HFA, PROAIR HFA) 90 mcg/actuation inhaler Take 2 Puffs by inhalation every four (4) hours as needed. Provider, Historical   Magnesium Oxide 500 mg cap Take 500 mg by mouth daily as needed. Provider, Historical       Physical Exam:   General: no distress   HEENT: Head: Normocephalic, no lesions, without obvious abnormality.    Heart: regular rate and rhythm, S1, S2 normal, no murmur, click, rub or gallop   Lungs: chest clear, no wheezing, rales, normal symmetric air entry   Abdominal: Bowel sounds are normal, liver is not enlarged, spleen is not enlarged   Neurological: Grossly normal   Extremities: extremities normal, atraumatic, no cyanosis or edema     Findings/Diagnosis: diarrhea    Plan of Care/Planned Procedure: colo with biopsy

## 2021-01-20 ENCOUNTER — TRANSCRIBE ORDER (OUTPATIENT)
Dept: SCHEDULING | Age: 76
End: 2021-01-20

## 2021-01-20 DIAGNOSIS — Q43.8 OTHER CONGENITAL ANOMALIES OF INTESTINE: Primary | ICD-10-CM

## 2021-01-20 DIAGNOSIS — Z53.8 PROCEDURE NOT CARRIED OUT FOR OTHER REASONS: ICD-10-CM

## 2021-01-20 DIAGNOSIS — Z12.11 SPECIAL SCREENING FOR MALIGNANT NEOPLASMS, COLON: ICD-10-CM

## 2021-02-22 ENCOUNTER — TRANSCRIBE ORDER (OUTPATIENT)
Dept: SCHEDULING | Age: 76
End: 2021-02-22

## 2021-02-22 DIAGNOSIS — Z53.8 PROCEDURE AND TREATMENT NOT CARRIED OUT FOR OTHER REASONS: ICD-10-CM

## 2021-02-22 DIAGNOSIS — K52.832 LYMPHOCYTIC COLITIS: ICD-10-CM

## 2021-02-22 DIAGNOSIS — K90.0 CELIAC DISEASE: ICD-10-CM

## 2021-02-22 DIAGNOSIS — Q43.8 TORTUOUS COLON: Primary | ICD-10-CM

## 2021-02-24 ENCOUNTER — TRANSCRIBE ORDER (OUTPATIENT)
Dept: SCHEDULING | Age: 76
End: 2021-02-24

## 2021-02-24 DIAGNOSIS — K56.2 VOLVULUS (HCC): Primary | ICD-10-CM

## 2021-02-26 ENCOUNTER — TRANSCRIBE ORDER (OUTPATIENT)
Dept: SCHEDULING | Age: 76
End: 2021-02-26

## 2021-02-26 DIAGNOSIS — Z53.8 PROCEDURE NOT CARRIED OUT FOR OTHER REASONS: ICD-10-CM

## 2021-02-26 DIAGNOSIS — Z86.010 PERSONAL HISTORY OF COLONIC POLYPS: Primary | ICD-10-CM

## 2021-02-26 DIAGNOSIS — K90.0 CELIAC DISEASE: ICD-10-CM

## 2021-02-26 DIAGNOSIS — K52.832 LYMPHOCYTIC COLITIS: ICD-10-CM

## 2021-02-26 DIAGNOSIS — Z12.11 SPECIAL SCREENING FOR MALIGNANT NEOPLASM OF COLON: ICD-10-CM

## 2021-02-26 DIAGNOSIS — Q43.8 OTHER CONGENITAL ANOMALIES OF INTESTINE: ICD-10-CM

## 2021-02-26 DIAGNOSIS — Q43.8 OTHER CONGENITAL ANOMALIES OF INTESTINE: Primary | ICD-10-CM

## 2021-03-03 ENCOUNTER — TRANSCRIBE ORDER (OUTPATIENT)
Dept: SCHEDULING | Age: 76
End: 2021-03-03

## 2021-03-03 DIAGNOSIS — Z53.8 PROCEDURE NOT CARRIED OUT FOR OTHER REASONS: Primary | ICD-10-CM

## 2021-03-03 DIAGNOSIS — K56.2 VOLVULUS (HCC): ICD-10-CM

## 2021-03-03 DIAGNOSIS — Z86.010 PERSONAL HISTORY OF COLONIC POLYPS: ICD-10-CM

## 2021-03-03 DIAGNOSIS — K90.0 CELIAC DISEASE: ICD-10-CM

## 2021-03-03 DIAGNOSIS — K52.832 LYMPHOCYTIC COLITIS: ICD-10-CM

## 2021-03-17 ENCOUNTER — HOSPITAL ENCOUNTER (OUTPATIENT)
Dept: CT IMAGING | Age: 76
Discharge: HOME | End: 2021-03-17
Attending: INTERNAL MEDICINE
Payer: MEDICARE

## 2021-03-17 DIAGNOSIS — K56.2 VOLVULUS (HCC): ICD-10-CM

## 2021-03-17 DIAGNOSIS — Z86.010 PERSONAL HISTORY OF COLONIC POLYPS: ICD-10-CM

## 2021-03-17 DIAGNOSIS — Z53.8 PROCEDURE NOT CARRIED OUT FOR OTHER REASONS: ICD-10-CM

## 2021-03-17 DIAGNOSIS — K52.832 LYMPHOCYTIC COLITIS: ICD-10-CM

## 2021-03-17 DIAGNOSIS — K90.0 CELIAC DISEASE: ICD-10-CM

## 2021-03-17 PROCEDURE — 74261 CT COLONOGRAPHY DX: CPT

## (undated) DEVICE — BLOCK BITE AD 60FR W/ VELC STRP ADDRESSES MOST PT AND

## (undated) DEVICE — BINDER ABD M/L H12IN FOR 46-62IN WHT 4 SLD PNL DSGN HOOP

## (undated) DEVICE — KIT GASTMY PERC PEG PULL 20FR -- ENDOVIVE BX/2

## (undated) DEVICE — NEEDLE INJ 25GA P5MM SHFT L230CM SHTH DIA2.5MM S STL TEF

## (undated) DEVICE — CONNECTOR TBNG OD5-7MM O2 END DISP

## (undated) DEVICE — CATH GASTMY BLLN 20FRX7-10ML --

## (undated) DEVICE — CANNULA NSL ORAL AD FOR CAPNOFLEX CO2 O2 AIRLFE

## (undated) DEVICE — CONTAINER PREFIL FRMLN 40ML --

## (undated) DEVICE — KENDALL RADIOLUCENT FOAM MONITORING ELECTRODE RECTANGULAR SHAPE: Brand: KENDALL

## (undated) DEVICE — SYR 3ML LL TIP 1/10ML GRAD --

## (undated) DEVICE — (D)GLOVE SURG TRIFLX 7.5 PWD L -- DISC BY MFR USE ITEM 302993

## (undated) DEVICE — NDL PRT INJ NSAF BLNT 18GX1.5 --

## (undated) DEVICE — SYR 5ML 1/5 GRAD LL NSAF LF --

## (undated) DEVICE — (D)GLOVE SURG TRIFLX 8 PWD LTX -- DISC BY MFR USE ITEM 302994

## (undated) DEVICE — FORCEPS BX L240CM JAW DIA2.8MM L CAP W/ NDL MIC MESH TOOTH